# Patient Record
Sex: MALE | Race: WHITE | NOT HISPANIC OR LATINO | Employment: FULL TIME | URBAN - METROPOLITAN AREA
[De-identification: names, ages, dates, MRNs, and addresses within clinical notes are randomized per-mention and may not be internally consistent; named-entity substitution may affect disease eponyms.]

---

## 2019-01-10 ENCOUNTER — OFFICE VISIT (OUTPATIENT)
Dept: NEUROLOGY | Facility: CLINIC | Age: 44
End: 2019-01-10
Payer: COMMERCIAL

## 2019-01-10 VITALS
WEIGHT: 184.3 LBS | DIASTOLIC BLOOD PRESSURE: 70 MMHG | SYSTOLIC BLOOD PRESSURE: 120 MMHG | HEIGHT: 67 IN | BODY MASS INDEX: 28.93 KG/M2 | HEART RATE: 82 BPM

## 2019-01-10 DIAGNOSIS — F41.9 ANXIETY DISORDER, UNSPECIFIED TYPE: ICD-10-CM

## 2019-01-10 DIAGNOSIS — R51.9 DAILY HEADACHE: ICD-10-CM

## 2019-01-10 DIAGNOSIS — S06.9X0A MILD TRAUMATIC BRAIN INJURY, WITHOUT LOSS OF CONSCIOUSNESS, INITIAL ENCOUNTER (HCC): Primary | ICD-10-CM

## 2019-01-10 PROBLEM — E78.00 PURE HYPERCHOLESTEROLEMIA: Status: ACTIVE | Noted: 2018-12-28

## 2019-01-10 PROBLEM — IMO0002 DEGENERATION OF INTERVERTEBRAL DISC: Status: ACTIVE | Noted: 2018-12-28

## 2019-01-10 PROBLEM — S06.9X9A MILD TRAUMATIC BRAIN INJURY (HCC): Status: ACTIVE | Noted: 2019-01-10

## 2019-01-10 PROBLEM — S06.9XAA MILD TRAUMATIC BRAIN INJURY (HCC): Status: ACTIVE | Noted: 2019-01-10

## 2019-01-10 PROBLEM — K21.9 GASTROESOPHAGEAL REFLUX DISEASE: Status: ACTIVE | Noted: 2018-12-28

## 2019-01-10 PROCEDURE — 99244 OFF/OP CNSLTJ NEW/EST MOD 40: CPT | Performed by: PSYCHIATRY & NEUROLOGY

## 2019-01-10 RX ORDER — IBUPROFEN 200 MG
200 TABLET ORAL EVERY 6 HOURS PRN
COMMUNITY
End: 2019-08-17 | Stop reason: ALTCHOICE

## 2019-01-10 RX ORDER — KETOROLAC TROMETHAMINE 10 MG/1
10 TABLET, FILM COATED ORAL DAILY PRN
Qty: 10 TABLET | Refills: 0 | Status: SHIPPED | OUTPATIENT
Start: 2019-01-10 | End: 2019-03-31

## 2019-01-10 RX ORDER — ALPRAZOLAM 0.5 MG/1
0.5 TABLET ORAL 3 TIMES DAILY PRN
COMMUNITY
Start: 2018-12-28

## 2019-01-10 NOTE — PATIENT INSTRUCTIONS
Cognitive Plan:   [x]  gradual return to normal cognitive activity with time    Activity Plan:  General counseling as discussed regarding appropriate level of tolerable physical activity  Gradual return to physical activity: At least 24-48 hours at each level  If no recurrence of symptoms, may advance to next level  If activity worsens symptoms, then drop back 1 level for 24-48 hours, then try again  It is ok to push through light symptoms, we just don't want to significantly exacerbate symptoms  []   Level 1: No activity  [x]   Level 2: Mild aerobic (walking, light exercise, stationary bike, easy swimming)  [x]   Level 3: Moderate aerobic + movement (jogging/running, hard swimming, etc)  []   Level 4: Heavy aerobic + movement + thinking (sprinting, running, non-contact sport specific drills)  []   Level 5: Full contact practice  []   Level 6: Full contact game/competitive play    Additional Testing or Referrals:    [x] Psychologist - cognitive behavioral therapy    Headache/migraine treatment:   Abortive medications (for immediate treatment of a headache): Ok to take ibuprofen or acetaminophen for headaches, but try to limit the amount and frequency that you are taking to avoid medication overuse/rebound headache    - gradually wean off of your daily ibuprofen  -     ketorolac (TORADOL) 10 mg tablet; Take 1 tablet (10 mg total) by mouth daily as needed for severe pain Max 1/day, 3/week, 10/month  Do not use with other OTC pain meds      Over the counter preventive supplements for headaches/migraines   (to take every day to help prevent headaches - not to take at the time of headache):  1  Magnesium 500mg daily    2  Riboflavin (Vitamin B2) 400mg daily  (FYI B2 may make your urine bright/neon yellow)  AND/OR  3   Herbal medication: Petasites/Butterbur 150 mg daily  (When choosing your Butterbur online or in the store, beware that there are some poor preps containing pyrrolizidine alkaloids (PAs) that can be harmful to the liver  Therefore, do not use butterbur products that are not certified and labeled as hepatotoxic PA-free )    Lifestyle Recommendations:  4  Maintain good sleep hygiene  Going to bed and waking up at consistent times, avoiding excessive daytime naps, avoiding caffeinated beverages in the evening, avoid excessive stimulation in the evening and generally using bed primarily for sleeping  One hour before bedtime would recommend turning lights down lower, decreasing your activity (may read quietly, listen to music at a low volume)  When you get into bed, should eliminate all technology (no texting, emailing, playing with your phone, iPad or tablet in bed)  5  Maintain good hydration  Drink  2L of fluid a day (4 typical small water bottles)  6  Maintain good nutrition  In particular don't skip meals and eat balanced meals regularly  Education and Follow-up  7  Please contact us if any questions or concerns arise    Follow up 4 weeks

## 2019-01-10 NOTE — PROGRESS NOTES
[address] [facility]   PATIENT:  Ann Rojo  MRN:  [id number]  :  1975  DATE OF SERVICE:  1/10/2019  REFERRED BY: Destiny Guzman MD  PMD: Niecy Perez MD    Assessment:     Ann Rojo is a delightful 37 y o  male referred here for evaluation of mild TBI/concussion that occurred on 2018 when he wiped out skiing and slammed forward hitting the front of his head, chest and arm  He was wearing a helmet  He denies loss of consciousness, amnesia, or other acute concussion symptoms that day  He did 2 more runs before stopping because of chest and arm pain  The next day headaches as well as other typical acute concussion symptoms developed, and on 2018 he went to the emergency room as headache worsened where noncontrast head CT was unremarkable, he was told he had a concussion and given a shot of Toradol which relieved headache for a few hours  The next 2 weeks headaches would come and go daily, some phonophobia, he had some trouble with cognitive dysfunction, but generally things were gradually improving over the first 2 weeks  He reports in the last 2 weeks things have worsened as discussed below  Contributing factors for possible prolonged recovery include: preexisting weekly headaches, possible medication overuse headache, anxiety or depression, stress  Neurocognitive assessment reveals normal neurological exam  [facility]   Per report noncontrast head CT 2018 was unremarkable    Daily headache:  Headaches have become more frequent and have developed into a constant daily headache  Mostly tension headache qualities, located bifrontal and bitemporal, throbbing aching dull  Associated with phonophobia in very loud noisy environments  He has been taking ibuprofen 400 mg t i d  For the past 4 5 weeks despite the fact that it does not usually help    We discussed likely component of medication overuse/rebound headache and recommended gradually weaning off the over the counter pain meds  - trial of Toradol 10 mg as needed to try and abort this headache since it helped in the past, discussed side effects, proper use and precautions  - should continue to gradually improve as he returns to his normal daily activities  - discussed considering headache preventative supplements including magnesium, riboflavin and butterbur  - if it does not resolve over the next month could consider prescription medication that could benefit headaches, depressed mood and anxiety - such as venlafaxine or low-dose amitriptyline    Mood:  He has a history of anxiety that has been recently controlled, however today it sounds like he has a some acute anxiety and depressed mood regarding his symptoms and restrictions related to his concussion  We discussed that daily fatigue, sleeping more than usual, decreased pleasure/interest in doing things he normally likes to do as well as his wife reporting she has noticed his mood has been a little low are all symptoms of depression and in this case likely acute adjustment disorder with depressed mood  - we discussed that as he gradually returns to his normal activities this should resolve to baseline, if it does not I discussed and suggested cognitive behavioral therapy  - if it does not resolve over the next month or so could consider prescription medication that could benefit headaches, depression mood and anxiety - such as venlafaxine or low-dose amitriptyline  - facial flushing of unknown etiology, although likely related to anxiety/mood and change from his normal routine, should gradually improve    Cognitive dysfunction:  He reports he feels his cognitive function is worse over the past 2 weeks  Problems with concentration, fogginess and fogginess  He reports this seems to worsen with more stimulation he has  He has been taking it easy over the past month with only 2-3 hours at work, rather than the normal 8 hr day he has in [facility]    - we discussed this is likely multifactorial, including likely related to mood and daily headache  We discussed interaction between anxiety, depressed mood and cognitive function and how hypervigilance and worrying about symptoms, concern for continued brain dysfunction from concussion, as well as change from normal routine can affect cognitive function  - recommended gradually returning to his normal cognitive function and discussed how he may have some symptoms as he works more, but this is at this point due to deconditioning    Mild TBI/concussion without loss of consciousness  We have discussed concussions and the natural course of recovery  We have discussed that symptoms from a concussion typically take 2-4 weeks to resolve, but that sometimes symptoms can linger on due to contributing factors  We discussed that at this point I recommend gradual return of his usual cognitive and physical activity, and have outlined a plan to follow so that activity may be added back slowly  Likelihood of Concussion:  Witnessed mechanism  yes   Typical Symptoms yes   Onset of symptoms within 24H yes   Improving Time Course yes   Is there an alternative Diagnosis unlikely     Typical Symptoms= Yes if:  ? 1 A symptoms: Loss of consciousness or Amnesia  ? 3 B symptoms: Confusion/Fogginess, Headache, Dizziness/Loss  of  Balance, Nausea/Vomiting, Drowsiness, Vision  Changes/Photophobia, Phonophobia, Tinnitus, Mood  change    How would you classify the concussion? definite     Plan/ Recommendation:       Cognitive Plan:   [x]  gradual return to normal cognitive activity with time    Activity Plan:  General counseling as discussed regarding appropriate level of tolerable physical activity  Recommended gradually returning to physical activity over the next days to weeks    Recommended avoiding activities that are risky for repeated head injury untill he is feeling closer to baseline, since if he is still deconditioned he'd be putting himself at higher risk for another fall  Additional Testing or Referrals:    [x] Psychologist - cognitive behavioral therapy - if needed in the future    Headache/migraine treatment:   Abortive medications (for immediate treatment of a headache): Ok to take ibuprofen or acetaminophen for headaches, but try to limit the amount and frequency that you are taking to avoid medication overuse/rebound headache    - gradually wean off of your daily ibuprofen  -     ketorolac (TORADOL) 10 mg tablet; Take 1 tablet (10 mg total) by mouth daily as needed for severe pain Max 1/day, 3/week, 10/month  Do not use with other OTC pain meds      Over the counter preventive supplements for headaches/migraines   (to take every day to help prevent headaches - not to take at the time of headache):  1  Magnesium 500mg daily    2  Riboflavin (Vitamin B2) 400mg daily  (FYI B2 may make your urine bright/neon yellow)  AND/OR  3  Herbal medication: Petasites/Butterbur 150 mg daily  (When choosing your Butterbur online or in the store, beware that there are some poor preps containing pyrrolizidine alkaloids (PAs) that can be harmful to the liver  Therefore, do not use butterbur products that are not certified and labeled as hepatotoxic PA-free )    Lifestyle Recommendations:  4  Maintain good sleep hygiene  Going to bed and waking up at consistent times, avoiding excessive daytime naps, avoiding caffeinated beverages in the evening, avoid excessive stimulation in the evening and generally using bed primarily for sleeping  One hour before bedtime would recommend turning lights down lower, decreasing your activity (may read quietly, listen to music at a low volume)  When you get into bed, should eliminate all technology (no texting, emailing, playing with your phone, iPad or tablet in bed)  5  Maintain good hydration  Drink  2L of fluid a day (4 typical small water bottles)  6  Maintain good nutrition   In particular don't skip meals and eat balanced meals regularly  Education and Follow-up  7  Please contact us if any questions or concerns arise  Follow up 4 weeks if needed, can cancel if symptoms improving or resolved          CC:   Francisco Barksdale is a 37y o  year old  right handed male who presents for evaluation following a possible concussion  History of Present Illness:     Date/time of injury:  12/9/18  Definite reported mechanism of injury?   (discrete event with force to the head or rapid head movement without impact): Yes   Mechanism/Cause of injury: while skiing  Impact Location: frontal  Intracranial injury or skull fx?: No  Amnesia:  Suraj? negative; Retro? negative; Loss of Conciousness?  did not occur   Seizure? No  Was there an onset of typical symptoms within 24-48 hours of the injury event? Yes     Specifics: On 12/9/18 Francisco Barksdale was skiing and hit a patch of wet sticky snow which brought him to an instant stop, he slammed forward, and hit the front of his head, chest and arm  He had a helmet on  He reports acute symptoms included: chest and arm sore  (No LOC, no amnesia, the first day had - no confusion, no headache, no N/V, no blurred vision, no dizziness)  He did two more runs and stopped because body started to hurt from fall    The next day Monday the headaches started, were bad  Tuesday 12/11/18 went to the ED - Noncontrast head CT was reportedly unremarkable  Given a shot of toradol which helped for a couple hours  Told he had a concussion  Next two weeks   - headaches would come and go every day, but there were times where they weren't bad or weren't there    - he was taking it easy and trying not to work too much  - Chad Heard went ok   At the end of the first two weeks things were better    The last two weeks things have gone down hill  Headaches more frequent and the past week it has been a constant headache, waves of worse or better    Daily Headache  - continuous over the past week or so   - severity 4 to 8/10  - location:  bifrontal, bitemporal (when first happened was in the back of the head)  - quality: throbbing, achy, dull  - associated symptoms include:  phonophobia if loud and noisy - overly crowded loud places    Denies photophobia, nausea, vomiting  - has been taking ibuprofen - 400 mg TID, for the past 4 5 weeks, does not usually help    Water - 32 oz a day   Diet: not skipping meals       Cognitive:  - 2-3 hours at work makes things more foggy, harder to concentrate  - Worse over the past two weeks  - Gets home and does nothing   - The more stimulation he would do the more pronounced the fuzzyness or fogginess  - Normally would be doing an 8 hour day 6-9 on average  - job: Real estate closing - title insurance     Mood:  Some symptoms of depressed mood lately  Daily fatigue  Sleeping more than usual and taking naps  Works for 3 hours and comes home and wants to do nothing    History of anxiety  Has been good for two years or more  Many years ago was bad with work  Lorazepam has not taken one in many months  Never on prescription medication   Never followed with a counselor       Facial flushing   Past week, facial flushing at night time, face and ears and neck gets real hot, while watching TV  - has not really happened much in the past - but maybe once a week or month if annoyed or angry or frustrated  BP has been checked and is around 748, diastolic as high as 88 one day     Lifestyle factors:  Physical activity at baseline: play golf once a week, ski  - 2-3 times a week, 1-2 mile walks a day with dog twice a day  Current level of physical activity: not walking as much - last two weeks once a day mostly , not skiing at all,   - fatigue     Sleep: more than usual - sleeping 8-9 hours  Normally 8 - so an hour more  Naps now almost every day for 15-30 mins     The following portions of the patient's history were reviewed in the system and updated as appropriate: allergies, current medications, past family history, past medical history, past social history, past surgical history and problem list     Past Medical History:     1  Any history of prior Concussion? Yes    4-5 years ago, skiing, symptoms lasted two weeks  Any other TBI's aside from Concussion? no   2  Preexisting Headache history? 1-2 twice a week - bifrontal   Headache Type:  [] Migraine  [x] Tension  3  Preexisting Psych history? negative      [x] Anxiety   Prior Psych treatment? no  4  Preexisting Learning disability?   no  5  Preexisting Sleep problems? no  6  History of seizures/epilepsy (non febrile) no    Past Medical History:   Diagnosis Date    Anxiety     Hyperlipidemia      Patient Active Problem List   Diagnosis    [facility] ([facility] [address] )    Daily headache    Anxiety disorder    Degeneration of intervertebral disc    Foot pain, bilateral    Gastroesophageal reflux disease    Lumbosacral spondylosis    Pure hypercholesterolemia       Medications:      Current Outpatient Prescriptions   Medication Sig Dispense Refill    ALPRAZolam (XANAX) 0 5 mg tablet Take 0 5 mg by mouth Three times daily as needed      ibuprofen (MOTRIN) 200 mg tablet Take 200 mg by mouth every 6 (six) hours as needed for mild pain      MAGNESIUM PO Take 1 tablet by mouth daily      Multiple Vitamin (MULTI-VITAMIN DAILY PO) Take 1 tablet by mouth daily         0     No current facility-administered medications for this visit           Allergies:    No Known Allergies    Family History:       Family History   Problem Relation Age of Onset    No Known Problems Mother     Hyperlipidemia Father     Alzheimer's disease Maternal Grandfather     Stomach cancer Paternal Grandfather          Social History:   Work:    Education: 12th  Lives with wife and dog    Illicit Drugs: denies  Alcohol/tobacco: Denies tobacco use, alcohol intake: social drinker    Social History     Social History    Marital status: /Civil Union     Spouse name: N/A    Number of children: N/A    Years of education: N/A     Occupational History    Not on file  Social History Main Topics    Smoking status: Never Smoker    Smokeless tobacco: Never Used    Alcohol use Yes      Comment: social use    Drug use: No    Sexual activity: Not on file     Other Topics Concern    Not on file     Social History Narrative    No narrative on file       Objective:                      Physical Exam:                                                                 Vitals:            Constitutional:    /70 (BP Location: Left arm, Patient Position: Sitting, Cuff Size: Adult)   Pulse 82   Ht 5' 7" (1 702 m)   Wt 83 6 kg (184 lb 4 8 oz)   BMI 28 87 kg/m²   BP Readings from Last 3 Encounters:   01/10/19 120/70     Pulse Readings from Last 3 Encounters:   01/10/19 82         Well developed, well nourished, well groomed  No dysmorphic features  HEENT:  Normocephalic atraumatic  No meningismus  Oropharynx is clear and moist  No oral mucosal lesions  Chest:  Respirations regular and unlabored  Cardiovascular:  Regular rate, intact distal pulses  Distal extremities warm without palpable edema or tenderness, no observed significant swelling  Musculoskeletal:  Full range of motion  (see below under neurologic exam for evaluation of motor function and gait)   Skin:  warm and dry, not diaphoretic  No apparent birthmarks or stigmata of neurocutaneous disease  Psychiatric:  Normal behavior and appropriate affect        Neurological Examination:     Mental status/cognitive function:   Orientated to time, place and person  Recent and remote memory intact  Attention span and concentration as well as fund of knowledge are appropriate for age  Normal language and spontaneous speech  STANDARD ASSESSMENT OF CONCUSSION ([address])     1  Orientation (1 point for each correct) Total 5 points   Score   What month is it? 1   What is the date today?  1   What is the day of the week? 1   What year is it? 1   What time is it right now? (within 1 hour is correct) 1      2  Immediate memory (1 point for each): 5 words, 3 trials - total 15 points  finger, edwar, blanket, lemon, insect    Alternates lists:  candle, paper, sugar, sandwich, wagon  baby, monkey, perfume, sunset, iron  elbow, apple, carpet, saddle, bubble  Jacket, arrow, pepper, cotton, movie  Dollar, honey, mirror, saddle, anchor       Trial 1 Trial 2 Trial 3   Word 1 1 1 1   Word 2 1 1 1   Word 3 1 1 1   Word 4 1 1 1   Word 5 1 1 1       3  Concentration:     a  Digits Backwards (1 point for each): 3, 4, 5, 6 digit span - Total 5 points  [x]493,  [K]9852,  []59646,      Alternate list:  Rodolfo Saavedra,  [address] [place] [address],  [address] [place] [address],     [address] [place] [address],  [address],  [][id number],  [][id number]  [] 415, []6007,  []04724,  [][id number]     b  Months in REVERSE order (1 point for entire sequence correct)  (Dec, Nov, Oct, Sept, Aug, July, June, May, April, March, Feb, Jan) [x]       4  Delayed recall 5 minutes later  (1 point for each of the 5 words) - Total 5 points    Word 1 [x]   Word 2 [x]   Word 3 []   Word 4 []   Word 5 []         TOTALS 1/10/2019      Orientation (of 5) 5    Immediate memory (of 15) 15    Concentration (of 5) 4    Delayed recall (of 5) 2    Total (max 30) 26        Cranial Nerves:  II-visual fields full  Fundi appear normal bilaterally  III, IV, VI-Pupils were equal, round, and reactive to light and accomodation  Extraocular movements were full and conjugate without nystagmus  conjugate gaze, normal smooth pursuits, normal saccades   V-facial sensation symmetric  VII-facial expression symmetric, intact forehead wrinkle, strong eye closure, symmetric smile    VIII-hearing grossly intact bilaterally   IX, X-palate elevation symmetric, no dysarthria  XI-shoulder shrug strength intact    XII-tongue protrusion midline  Motor Exam: symmetric bulk and tone throughout, no pronator drift   Power/strength 5/5 bilateral upper and lower extremities, no atrophy, fasciculations or abnormal movements noted  Sensory: grossly intact light touch in all extremities  Reflexes: brachioradialis 2+, biceps 2+, knee 2+, ankle 2+ bilaterally  No ankle clonus  Coordination: Finger nose finger intact bilaterally, no apparent dysmetria, ataxia or tremor noted  Gait: steady casual and tandem gait  Romberg Negative  Pertinent lab results: none available      Imaging:   [x] CT: [x] Normal per report on 12/11/2018         Review of Systems:   ROS obtained by medical assistant Personally reviewed and updated if indicated  Review of Systems   Constitutional: Positive for fatigue  Negative for appetite change and fever  HENT: Negative  Negative for hearing loss, tinnitus, trouble swallowing and voice change  Eyes: Negative  Negative for photophobia and pain  Respiratory: Negative  Negative for shortness of breath  Cardiovascular: Negative  Negative for palpitations  Gastrointestinal: Negative  Negative for nausea and vomiting  Endocrine: Negative  Negative for cold intolerance and heat intolerance  Genitourinary: Negative  Negative for dysuria, frequency and urgency  Musculoskeletal: Negative  Negative for myalgias and neck pain  Skin: Negative  Negative for rash  Neurological: Positive for headaches  Negative for dizziness, tremors, seizures, syncope, facial asymmetry, speech difficulty, weakness, light-headedness and numbness  Hematological: Negative  Does not bruise/bleed easily  Psychiatric/Behavioral: Positive for confusion and sleep disturbance  Negative for hallucinations  I have spent 60 minutes with Patient and family today in which greater than 50% of this time was spent in counseling/coordination of care regarding Prognosis, Risks and benefits of tx options, Intructions for management, Patient and family education, Importance of tx compliance, Risk factor reductions and Impressions        Author:  Andreea Zhao MD [facility] trained Concussion Specialist

## 2019-01-10 NOTE — LETTER
January 10, 2019     Topher Jose MD  [facility]  [facility]  [address] [address]    Patient: Francisco Barksdale   YOB: 1975   Date of Visit: 1/10/2019       Dear Dr Pednleton : Thank you for referring Francisco Barksdale to me for evaluation  Below are my notes for this consultation  If you have questions, please do not hesitate to call me  I look forward to following your patient along with you  Sincerely,        Delinda Jeans, MD        CC: No Recipients  Delinda Jeans, MD  1/10/2019  3:59 PM  Sign at close encounter  [facility] Consult   PATIENT:  Francisco Barksdale  MRN:  [id number]  :  1975  DATE OF SERVICE:  1/10/2019  REFERRED BY: Sharee Mcfarlane MD  PMD: Topher Jose MD    Assessment:     Francisco Barksdale is a delightful 37 y o  male referred here for evaluation of mild TBI/concussion that occurred on 2018 when he wiped out skiing and slammed forward hitting the front of his head, chest and arm  He was wearing a helmet  He denies loss of consciousness, amnesia, or other acute concussion symptoms that day  He did 2 more runs before stopping because of chest and arm pain  The next day headaches as well as other typical acute concussion symptoms developed, and on 2018 he went to the emergency room as headache worsened where noncontrast head CT was unremarkable, he was told he had a concussion and given a shot of Toradol which relieved headache for a few hours  The next 2 weeks headaches would come and go daily, some phonophobia, he had some trouble with cognitive dysfunction, but generally things were gradually improving over the first 2 weeks  He reports in the last 2 weeks things have worsened as discussed below  Contributing factors for possible prolonged recovery include: preexisting weekly headaches, possible medication overuse headache, anxiety or depression, stress      Neurocognitive assessment reveals normal neurological exam  [facility] 26/30  Per report noncontrast head CT 12/11/2018 was unremarkable    Daily headache:  Headaches have become more frequent and have developed into a constant daily headache  Mostly tension headache qualities, located bifrontal and bitemporal, throbbing aching dull  Associated with phonophobia in very loud noisy environments  He has been taking ibuprofen 400 mg t i d  For the past 4 5 weeks despite the fact that it does not usually help  We discussed likely component of medication overuse/rebound headache and recommended gradually weaning off the over the counter pain meds  - trial of Toradol 10 mg as needed to try and abort this headache since it helped in the past, discussed side effects, proper use and precautions  - should continue to gradually improve as he returns to his normal daily activities  - discussed considering headache preventative supplements including magnesium, riboflavin and butterbur  - if it does not resolve over the next month could consider prescription medication that could benefit headaches, depressed mood and anxiety - such as venlafaxine or low-dose amitriptyline    Mood:  He has a history of anxiety that has been recently controlled, however today it sounds like he has a some acute anxiety and depressed mood regarding his symptoms and restrictions related to his concussion  We discussed that daily fatigue, sleeping more than usual, decreased pleasure/interest in doing things he normally likes to do as well as his wife reporting she has noticed his mood has been a little low are all symptoms of depression and in this case likely acute adjustment disorder with depressed mood     - we discussed that as he gradually returns to his normal activities this should resolve to baseline, if it does not I discussed and suggested cognitive behavioral therapy  - if it does not resolve over the next month or so could consider prescription medication that could benefit headaches, depression mood and anxiety - such as venlafaxine or low-dose amitriptyline  - facial flushing of unknown etiology, although likely related to anxiety/mood and change from his normal routine, should gradually improve    Cognitive dysfunction:  He reports he feels his cognitive function is worse over the past 2 weeks  Problems with concentration, fogginess and fogginess  He reports this seems to worsen with more stimulation he has  He has been taking it easy over the past month with only 2-3 hours at work, rather than the normal 8 hr day he has in [employer]  - we discussed this is likely multifactorial, including likely related to mood and daily headache  We discussed interaction between anxiety, depressed mood and cognitive function and how hypervigilance and worrying about symptoms, concern for continued brain dysfunction from concussion, as well as change from normal routine can affect cognitive function  - recommended gradually returning to his normal cognitive function and discussed how he may have some symptoms as he works more, but this is at this point due to deconditioning    Mild TBI/concussion without loss of consciousness  We have discussed concussions and the natural course of recovery  We have discussed that symptoms from a concussion typically take 2-4 weeks to resolve, but that sometimes symptoms can linger on due to contributing factors  We discussed that at this point I recommend gradual return of his usual cognitive and physical activity, and have outlined a plan to follow so that activity may be added back slowly  Likelihood of Concussion:  Witnessed mechanism  yes   Typical Symptoms yes   Onset of symptoms within 24H yes   Improving Time Course yes   Is there an alternative Diagnosis unlikely     Typical Symptoms= Yes if:  ? 1 A symptoms: Loss of consciousness or Amnesia  ?  3 B symptoms: Confusion/Fogginess, Headache, Dizziness/Loss  of  Balance, Nausea/Vomiting, Drowsiness, Vision Changes/Photophobia, Phonophobia, Tinnitus, Mood  change    How would you classify the concussion? definite     Plan/ Recommendation:       Cognitive Plan:   [x]  gradual return to normal cognitive activity with time    Activity Plan:  General counseling as discussed regarding appropriate level of tolerable physical activity  Recommended gradually returning to physical activity over the next days to weeks  Recommended avoiding activities that are risky for repeated head injury untill he is feeling closer to baseline, since if he is still deconditioned he'd be putting himself at higher risk for another fall  Additional Testing or Referrals:    [x] Psychologist - cognitive behavioral therapy - if needed in the future    Headache/migraine treatment:   Abortive medications (for immediate treatment of a headache): Ok to take ibuprofen or acetaminophen for headaches, but try to limit the amount and frequency that you are taking to avoid medication overuse/rebound headache    - gradually wean off of your daily ibuprofen  -     ketorolac (TORADOL) 10 mg tablet; Take 1 tablet (10 mg total) by mouth daily as needed for severe pain Max 1/day, 3/week, 10/month  Do not use with other OTC pain meds      Over the counter preventive supplements for headaches/migraines   (to take every day to help prevent headaches - not to take at the time of headache):  1  Magnesium 500mg daily    2  Riboflavin (Vitamin B2) 400mg daily  (FYI B2 may make your urine bright/neon yellow)  AND/OR  3  Herbal medication: Petasites/Butterbur 150 mg daily  (When choosing your Butterbur online or in the store, beware that there are some poor preps containing pyrrolizidine alkaloids (PAs) that can be harmful to the liver  Therefore, do not use butterbur products that are not certified and labeled as hepatotoxic PA-free )    Lifestyle Recommendations:  4  Maintain good sleep hygiene    Going to bed and waking up at consistent times, avoiding excessive daytime naps, avoiding caffeinated beverages in the evening, avoid excessive stimulation in the evening and generally using bed primarily for sleeping  One hour before bedtime would recommend turning lights down lower, decreasing your activity (may read quietly, listen to music at a low volume)  When you get into bed, should eliminate all technology (no texting, emailing, playing with your phone, iPad or tablet in bed)  5  Maintain good hydration  Drink  2L of fluid a day (4 typical small water bottles)  6  Maintain good nutrition  In particular don't skip meals and eat balanced meals regularly  Education and Follow-up  7  Please contact us if any questions or concerns arise  Follow up 4 weeks if needed, can cancel if symptoms improving or resolved          CC:   Anjum Sanchez is a 37y o  year old  right handed male who presents for evaluation following a possible concussion  History of Present Illness:     Date/time of injury:  12/9/18  Definite reported mechanism of injury?   (discrete event with force to the head or rapid head movement without impact): Yes   Mechanism/Cause of injury: while skiing  Impact Location: frontal  Intracranial injury or skull fx?: No  Amnesia:  Suraj? negative; Retro? negative; Loss of Conciousness?  did not occur   Seizure? No  Was there an onset of typical symptoms within 24-48 hours of the injury event? Yes     Specifics: On 12/9/18 Anjum Sanchez was skiing and hit a patch of wet sticky snow which brought him to an instant stop, he slammed forward, and hit the front of his head, chest and arm  He had a helmet on   He reports acute symptoms included: chest and arm sore  (No LOC, no amnesia, the first day had - no confusion, no headache, no N/V, no blurred vision, no dizziness)  He did two more runs and stopped because body started to hurt from fall    The next day Monday the headaches started, were bad  Tuesday 12/11/18 went to the ED - Noncontrast head CT was reportedly unremarkable  Given a shot of toradol which helped for a couple hours  Told he had a concussion  Next two weeks   - headaches would come and go every day, but there were times where they weren't bad or weren't there  - he was taking it easy and trying not to work too much  - Cheryl Beaulieu went ok   At the end of the first two weeks things were better    The last two weeks things have gone down hill  Headaches more frequent and the past week it has been a constant headache, waves of worse or better    Daily Headache  - continuous over the past week or so   - severity 4 to 8/10  - location:  bifrontal, bitemporal (when first happened was in the back of the head)  - quality: throbbing, achy, dull  - associated symptoms include:  phonophobia if loud and noisy - overly crowded loud places    Denies photophobia, nausea, vomiting  - has been taking ibuprofen - 400 mg TID, for the past 4 5 weeks, does not usually help    Water - 32 oz a day   Diet: not skipping meals       Cognitive:  - 2-3 hours at work makes things more foggy, harder to concentrate  - Worse over the past two weeks  - Gets home and does nothing   - The more stimulation he would do the more pronounced the fuzzyness or fogginess  - Normally would be doing an 8 hour day 6-9 on average  - job: Real estate closing - title [employer]     Mood:  Some symptoms of depressed mood lately  Daily fatigue  Sleeping more than usual and taking naps  Works for 3 hours and comes home and wants to do nothing    History of anxiety  Has been good for two years or more  Many years ago was bad with work  Lorazepam has not taken one in many months  Never on prescription medication   Never followed with a counselor       Facial flushing   Past week, facial flushing at night time, face and ears and neck gets real hot, while watching TV  - has not really happened much in the past - but maybe once a week or month if annoyed or angry or frustrated  BP has been checked and is around 120, diastolic as high as 88 one day     Lifestyle factors:  Physical activity at baseline: play golf once a week, ski  - 2-3 times a week, 1-2 mile walks a day with dog twice a day  Current level of physical activity: not walking as much - last two weeks once a day mostly , not skiing at all,   - fatigue     Sleep: more than usual - sleeping 8-9 hours  Normally 8 - so an hour more  Naps now almost every day for 15-30 mins     The following portions of the patient's history were reviewed in the system and updated as appropriate: allergies, current medications, past family history, past medical history, past social history, past surgical history and problem list     Past Medical History:     1  Any history of prior Concussion? Yes    4-5 years ago, skiing, symptoms lasted two weeks  Any other TBI's aside from Concussion? no   2  Preexisting Headache history? 1-2 twice a week - bifrontal   Headache Type:  [] Migraine  [x] Tension  3  Preexisting Psych history? negative      [x] Anxiety   Prior Psych treatment? no  4  Preexisting Learning disability?   no  5  Preexisting Sleep problems? no  6   History of seizures/epilepsy (non febrile) no    Past Medical History:   Diagnosis Date    Anxiety     Hyperlipidemia      Patient Active Problem List   Diagnosis    Mild traumatic brain injury ([facility] [address] )    Daily headache    Anxiety disorder    Degeneration of intervertebral disc    Foot pain, bilateral    Gastroesophageal reflux disease    Lumbosacral spondylosis    Pure hypercholesterolemia       Medications:      Current Outpatient Prescriptions   Medication Sig Dispense Refill    ALPRAZolam (XANAX) 0 5 mg tablet Take 0 5 mg by mouth Three times daily as needed      ibuprofen (MOTRIN) 200 mg tablet Take 200 mg by mouth every 6 (six) hours as needed for mild pain      MAGNESIUM PO Take 1 tablet by mouth daily      Multiple Vitamin (MULTI-VITAMIN DAILY PO) Take 1 tablet by mouth daily         0     No current facility-administered medications for this visit  Allergies:    No Known Allergies    Family History:       Family History   Problem Relation Age of Onset    No Known Problems Mother     Hyperlipidemia Father     Alzheimer's disease Maternal Grandfather     Stomach cancer Paternal Grandfather          Social History:   Work:    Education: 12th  Lives with wife and dog    Illicit Drugs: denies  Alcohol/tobacco: Denies tobacco use, alcohol intake: social drinker    Social History     Social History    Marital status: /Civil Union     Spouse name: N/A    Number of children: N/A    Years of education: N/A     Occupational History    Not on file  Social History Main Topics    Smoking status: Never Smoker    Smokeless tobacco: Never Used    Alcohol use Yes      Comment: social use    Drug use: No    Sexual activity: Not on file     Other Topics Concern    Not on file     Social History Narrative    No narrative on file       Objective:                      Physical Exam:                                                                 Vitals:            Constitutional:    /70 (BP Location: Left arm, Patient Position: Sitting, Cuff Size: Adult)   Pulse 82   Ht 5' 7" (1 702 m)   Wt 83 6 kg (184 lb 4 8 oz)   BMI 28 87 kg/m²    BP Readings from Last 3 Encounters:   01/10/19 120/70     Pulse Readings from Last 3 Encounters:   01/10/19 82         Well developed, well nourished, well groomed  No dysmorphic features  HEENT:  Normocephalic atraumatic  No meningismus  Oropharynx is clear and moist  No oral mucosal lesions  Chest:  Respirations regular and unlabored  Cardiovascular:  Regular rate, intact distal pulses  Distal extremities warm without palpable edema or tenderness, no observed significant swelling  Musculoskeletal:  Full range of motion   (see below under neurologic exam for evaluation of motor function and gait)   Skin:  warm and dry, not diaphoretic  No apparent birthmarks or stigmata of neurocutaneous disease  Psychiatric:  Normal behavior and appropriate affect        Neurological Examination:     Mental status/cognitive function:   Orientated to time, place and person  Recent and remote memory intact  Attention span and concentration as well as fund of knowledge are appropriate for age  Normal language and spontaneous speech  STANDARD ASSESSMENT OF CONCUSSION ([address])     1  Orientation (1 point for each correct) Total 5 points   Score   What month is it? 1   What is the date today? 1   What is the day of the week? 1   What year is it? 1   What time is it right now? (within 1 hour is correct) 1      2  Immediate memory (1 point for each): 5 words, 3 trials - total 15 points  finger, edwar, blanket, lemon, insect    Alternates lists:  candle, paper, sugar, sandwich, wagon  baby, monkey, perfume, sunset, iron  elbow, apple, carpet, saddle, bubble  Jacket, arrow, pepper, cotton, movie  Dollar, honey, mirror, saddle, anchor       Trial 1 Trial 2 Trial 3   Word 1 1 1 1   Word 2 1 1 1   Word 3 1 1 1   Word 4 1 1 1   Word 5 1 1 1       3  Concentration:     a  Digits Backwards (1 point for each): 3, 4, 5, 6 digit span - Total 5 points  [x]493,  [V]7365,  []67905,      Alternate list:  Cheko Al[place]ga,  [address] [place] [address],  [address] [place] [address],     [address] [place] [address],  [address],  [][id number],  [][id number]  [] 415, []8663,  [][id number],  [][id number]     b  Months in REVERSE order (1 point for entire sequence correct)  (Dec, Nov, Oct, Sept, Aug, July, June, May, April, March, Feb, Jose) [x]       4  Delayed recall 5 minutes later  (1 point for each of the 5 words) - Total 5 points    Word 1 [x]   Word 2 [x]   Word 3 []   Word 4 []   Word 5 []         TOTALS 1/10/2019      Orientation (of 5) 5    Immediate memory (of 15) 15    Concentration (of 5) 4    Delayed recall (of 5) 2    Total (max 30) 26        Cranial Nerves:  II-visual fields full  Fundi appear normal bilaterally    III, IV, VI-Pupils were equal, round, and reactive to light and accomodation  Extraocular movements were full and conjugate without nystagmus  conjugate gaze, normal smooth pursuits, normal saccades   V-facial sensation symmetric  VII-facial expression symmetric, intact forehead wrinkle, strong eye closure, symmetric smile    VIII-hearing grossly intact bilaterally   IX, X-palate elevation symmetric, no dysarthria  XI-shoulder shrug strength intact    XII-tongue protrusion midline  Motor Exam: symmetric bulk and tone throughout, no pronator drift  Power/strength 5/5 bilateral upper and lower extremities, no atrophy, fasciculations or abnormal movements noted  Sensory: grossly intact light touch in all extremities  Reflexes: brachioradialis 2+, biceps 2+, knee 2+, ankle 2+ bilaterally  No ankle clonus  Coordination: Finger nose finger intact bilaterally, no apparent dysmetria, ataxia or tremor noted  Gait: steady casual and tandem gait  Romberg Negative  Pertinent lab results: none available      Imaging:   [x] CT: [x] Normal per report on 12/11/2018         Review of Systems:   ROS obtained by medical assistant Personally reviewed and updated if indicated  Review of Systems   Constitutional: Positive for fatigue  Negative for appetite change and fever  HENT: Negative  Negative for hearing loss, tinnitus, trouble swallowing and voice change  Eyes: Negative  Negative for photophobia and pain  Respiratory: Negative  Negative for shortness of breath  Cardiovascular: Negative  Negative for palpitations  Gastrointestinal: Negative  Negative for nausea and vomiting  Endocrine: Negative  Negative for cold intolerance and heat intolerance  Genitourinary: Negative  Negative for dysuria, frequency and urgency  Musculoskeletal: Negative  Negative for myalgias and neck pain  Skin: Negative  Negative for rash  Neurological: Positive for headaches   Negative for dizziness, tremors, seizures, syncope, facial asymmetry, speech difficulty, weakness, light-headedness and numbness  Hematological: Negative  Does not bruise/bleed easily  Psychiatric/Behavioral: Positive for confusion and sleep disturbance  Negative for hallucinations  I have spent 60 minutes with Patient and family today in which greater than 50% of this time was spent in counseling/coordination of care regarding Prognosis, Risks and benefits of tx options, Intructions for management, Patient and family education, Importance of tx compliance, Risk factor reductions and Impressions        Author:  Casey Carrillo MD   Fellowship trained Concussion Specialist

## 2019-01-25 ENCOUNTER — TELEPHONE (OUTPATIENT)
Dept: NEUROLOGY | Facility: CLINIC | Age: 44
End: 2019-01-25

## 2019-01-25 NOTE — TELEPHONE ENCOUNTER
I will review the labs when they arrive, however if done within the past month not related to concussion certainly since the pathophysiology of concussion is typically 2 weeks  Eosinophillia is not uncommon and is often an incidental finding on blood work  Certainly his primary doctor will likely repeat the test in the future and may send more testing but in about 50% of cases a source for eosinophilia is not identified despite exhaustive evaluation

## 2019-01-25 NOTE — TELEPHONE ENCOUNTER
Patient is requesting to speak to Dr Rhianna Emanuel  States he was reviewing his labs ordered by his PCP and noticed his absolute eosinophils, were elevated (he doesn't know the exact number)   He is questioning if this increased level correlates with his concussion  He is just curious  He will request his PCPs to send labs to us

## 2019-03-31 ENCOUNTER — OFFICE VISIT (OUTPATIENT)
Dept: URGENT CARE | Facility: CLINIC | Age: 44
End: 2019-03-31
Payer: COMMERCIAL

## 2019-03-31 VITALS
SYSTOLIC BLOOD PRESSURE: 102 MMHG | OXYGEN SATURATION: 96 % | HEART RATE: 75 BPM | RESPIRATION RATE: 16 BRPM | BODY MASS INDEX: 26.68 KG/M2 | HEIGHT: 67 IN | WEIGHT: 170 LBS | DIASTOLIC BLOOD PRESSURE: 68 MMHG | TEMPERATURE: 97.3 F

## 2019-03-31 DIAGNOSIS — J06.9 VIRAL URI: Primary | ICD-10-CM

## 2019-03-31 PROCEDURE — 99213 OFFICE O/P EST LOW 20 MIN: CPT | Performed by: PHYSICIAN ASSISTANT

## 2019-03-31 RX ORDER — BENZONATATE 200 MG/1
200 CAPSULE ORAL 3 TIMES DAILY PRN
Qty: 20 CAPSULE | Refills: 0 | Status: SHIPPED | OUTPATIENT
Start: 2019-03-31 | End: 2019-08-17 | Stop reason: ALTCHOICE

## 2019-03-31 RX ORDER — FLUTICASONE PROPIONATE 50 MCG
2 SPRAY, SUSPENSION (ML) NASAL DAILY
Qty: 16 G | Refills: 0 | Status: SHIPPED | OUTPATIENT
Start: 2019-03-31 | End: 2019-08-17 | Stop reason: ALTCHOICE

## 2019-08-17 ENCOUNTER — APPOINTMENT (EMERGENCY)
Dept: CT IMAGING | Facility: HOSPITAL | Age: 44
End: 2019-08-17
Payer: COMMERCIAL

## 2019-08-17 ENCOUNTER — HOSPITAL ENCOUNTER (EMERGENCY)
Facility: HOSPITAL | Age: 44
Discharge: HOME | End: 2019-08-17
Attending: EMERGENCY MEDICINE
Payer: COMMERCIAL

## 2019-08-17 VITALS
SYSTOLIC BLOOD PRESSURE: 106 MMHG | OXYGEN SATURATION: 98 % | RESPIRATION RATE: 18 BRPM | HEART RATE: 60 BPM | TEMPERATURE: 97.9 F | DIASTOLIC BLOOD PRESSURE: 62 MMHG

## 2019-08-17 DIAGNOSIS — N20.0 KIDNEY STONE: Primary | ICD-10-CM

## 2019-08-17 LAB
ALBUMIN SERPL BCP-MCNC: 4.1 G/DL (ref 3.5–5)
ALP SERPL-CCNC: 96 U/L (ref 46–116)
ALT SERPL W P-5'-P-CCNC: 21 U/L (ref 12–78)
ANION GAP SERPL CALCULATED.3IONS-SCNC: 15 MMOL/L (ref 4–13)
AST SERPL W P-5'-P-CCNC: 22 U/L (ref 5–45)
BACTERIA UR QL AUTO: ABNORMAL /HPF
BASOPHILS # BLD AUTO: 0.04 THOUSANDS/ΜL (ref 0–0.1)
BASOPHILS NFR BLD AUTO: 0 % (ref 0–1)
BILIRUB SERPL-MCNC: 0.9 MG/DL (ref 0.2–1)
BILIRUB UR QL STRIP: NEGATIVE
BUN SERPL-MCNC: 20 MG/DL (ref 5–25)
CALCIUM SERPL-MCNC: 9.7 MG/DL (ref 8.3–10.1)
CHLORIDE SERPL-SCNC: 104 MMOL/L (ref 100–108)
CLARITY UR: ABNORMAL
CO2 SERPL-SCNC: 21 MMOL/L (ref 21–32)
COLOR UR: YELLOW
CREAT SERPL-MCNC: 1.34 MG/DL (ref 0.6–1.3)
EOSINOPHIL # BLD AUTO: 0.03 THOUSAND/ΜL (ref 0–0.61)
EOSINOPHIL NFR BLD AUTO: 0 % (ref 0–6)
ERYTHROCYTE [DISTWIDTH] IN BLOOD BY AUTOMATED COUNT: 12.2 % (ref 11.6–15.1)
GFR SERPL CREATININE-BSD FRML MDRD: 63 ML/MIN/1.73SQ M
GLUCOSE SERPL-MCNC: 127 MG/DL (ref 65–140)
GLUCOSE UR STRIP-MCNC: NEGATIVE MG/DL
HCT VFR BLD AUTO: 45.7 % (ref 36.5–49.3)
HGB BLD-MCNC: 15.8 G/DL (ref 12–17)
HGB UR QL STRIP.AUTO: ABNORMAL
IMM GRANULOCYTES # BLD AUTO: 0.05 THOUSAND/UL (ref 0–0.2)
IMM GRANULOCYTES NFR BLD AUTO: 0 % (ref 0–2)
KETONES UR STRIP-MCNC: ABNORMAL MG/DL
LEUKOCYTE ESTERASE UR QL STRIP: NEGATIVE
LIPASE SERPL-CCNC: 175 U/L (ref 73–393)
LYMPHOCYTES # BLD AUTO: 1.2 THOUSANDS/ΜL (ref 0.6–4.47)
LYMPHOCYTES NFR BLD AUTO: 8 % (ref 14–44)
MCH RBC QN AUTO: 31.3 PG (ref 26.8–34.3)
MCHC RBC AUTO-ENTMCNC: 34.6 G/DL (ref 31.4–37.4)
MCV RBC AUTO: 91 FL (ref 82–98)
MONOCYTES # BLD AUTO: 1.1 THOUSAND/ΜL (ref 0.17–1.22)
MONOCYTES NFR BLD AUTO: 7 % (ref 4–12)
NEUTROPHILS # BLD AUTO: 13.11 THOUSANDS/ΜL (ref 1.85–7.62)
NEUTS SEG NFR BLD AUTO: 85 % (ref 43–75)
NITRITE UR QL STRIP: NEGATIVE
NON-SQ EPI CELLS URNS QL MICRO: ABNORMAL /HPF
NRBC BLD AUTO-RTO: 0 /100 WBCS
PH UR STRIP.AUTO: 8.5 [PH]
PLATELET # BLD AUTO: 213 THOUSANDS/UL (ref 149–390)
PMV BLD AUTO: 10.2 FL (ref 8.9–12.7)
POTASSIUM SERPL-SCNC: 3.6 MMOL/L (ref 3.5–5.3)
PROT SERPL-MCNC: 7.7 G/DL (ref 6.4–8.2)
PROT UR STRIP-MCNC: ABNORMAL MG/DL
RBC # BLD AUTO: 5.05 MILLION/UL (ref 3.88–5.62)
RBC #/AREA URNS AUTO: ABNORMAL /HPF
SODIUM SERPL-SCNC: 140 MMOL/L (ref 136–145)
SP GR UR STRIP.AUTO: 1.01 (ref 1–1.03)
UROBILINOGEN UR QL STRIP.AUTO: 0.2 E.U./DL
WBC # BLD AUTO: 15.53 THOUSAND/UL (ref 4.31–10.16)
WBC #/AREA URNS AUTO: ABNORMAL /HPF

## 2019-08-17 PROCEDURE — 96374 THER/PROPH/DIAG INJ IV PUSH: CPT

## 2019-08-17 PROCEDURE — 36415 COLL VENOUS BLD VENIPUNCTURE: CPT

## 2019-08-17 PROCEDURE — 81001 URINALYSIS AUTO W/SCOPE: CPT | Performed by: EMERGENCY MEDICINE

## 2019-08-17 PROCEDURE — 74176 CT ABD & PELVIS W/O CONTRAST: CPT

## 2019-08-17 PROCEDURE — 99284 EMERGENCY DEPT VISIT MOD MDM: CPT

## 2019-08-17 PROCEDURE — 83690 ASSAY OF LIPASE: CPT | Performed by: EMERGENCY MEDICINE

## 2019-08-17 PROCEDURE — 99284 EMERGENCY DEPT VISIT MOD MDM: CPT | Performed by: EMERGENCY MEDICINE

## 2019-08-17 PROCEDURE — 85025 COMPLETE CBC W/AUTO DIFF WBC: CPT | Performed by: EMERGENCY MEDICINE

## 2019-08-17 PROCEDURE — 80053 COMPREHEN METABOLIC PANEL: CPT | Performed by: EMERGENCY MEDICINE

## 2019-08-17 PROCEDURE — 96375 TX/PRO/DX INJ NEW DRUG ADDON: CPT

## 2019-08-17 RX ORDER — ONDANSETRON 2 MG/ML
4 INJECTION INTRAMUSCULAR; INTRAVENOUS ONCE
Status: COMPLETED | OUTPATIENT
Start: 2019-08-17 | End: 2019-08-17

## 2019-08-17 RX ORDER — MORPHINE SULFATE 4 MG/ML
4 INJECTION, SOLUTION INTRAMUSCULAR; INTRAVENOUS ONCE
Status: COMPLETED | OUTPATIENT
Start: 2019-08-17 | End: 2019-08-17

## 2019-08-17 RX ORDER — MORPHINE SULFATE 15 MG/1
7.5 TABLET ORAL EVERY 4 HOURS PRN
Qty: 9 TABLET | Refills: 0 | Status: SHIPPED | OUTPATIENT
Start: 2019-08-17 | End: 2019-08-20

## 2019-08-17 RX ORDER — PANTOPRAZOLE SODIUM 40 MG/1
40 TABLET, DELAYED RELEASE ORAL DAILY
COMMUNITY
Start: 2019-08-06 | End: 2020-08-05

## 2019-08-17 RX ORDER — KETOROLAC TROMETHAMINE 30 MG/ML
15 INJECTION, SOLUTION INTRAMUSCULAR; INTRAVENOUS ONCE
Status: COMPLETED | OUTPATIENT
Start: 2019-08-17 | End: 2019-08-17

## 2019-08-17 RX ADMIN — KETOROLAC TROMETHAMINE 15 MG: 30 INJECTION, SOLUTION INTRAMUSCULAR at 11:01

## 2019-08-17 RX ADMIN — MORPHINE SULFATE 4 MG: 4 INJECTION INTRAVENOUS at 12:03

## 2019-08-17 RX ADMIN — ONDANSETRON 4 MG: 2 INJECTION INTRAMUSCULAR; INTRAVENOUS at 10:59

## 2019-08-19 ENCOUNTER — HOSPITAL ENCOUNTER (EMERGENCY)
Facility: HOSPITAL | Age: 44
Discharge: HOME | End: 2019-08-19
Attending: EMERGENCY MEDICINE | Admitting: EMERGENCY MEDICINE
Payer: COMMERCIAL

## 2019-08-19 VITALS
BODY MASS INDEX: 26.37 KG/M2 | HEIGHT: 67 IN | DIASTOLIC BLOOD PRESSURE: 59 MMHG | HEART RATE: 54 BPM | SYSTOLIC BLOOD PRESSURE: 99 MMHG | WEIGHT: 167.99 LBS | RESPIRATION RATE: 16 BRPM | TEMPERATURE: 98.2 F | OXYGEN SATURATION: 98 %

## 2019-08-19 DIAGNOSIS — N20.1 URETEROLITHIASIS: ICD-10-CM

## 2019-08-19 DIAGNOSIS — N23 URETERAL COLIC: Primary | ICD-10-CM

## 2019-08-19 LAB
ANION GAP SERPL CALCULATED.3IONS-SCNC: 8 MMOL/L (ref 4–13)
BACTERIA UR QL AUTO: ABNORMAL /HPF
BASOPHILS # BLD AUTO: 0.02 THOUSANDS/ΜL (ref 0–0.1)
BASOPHILS NFR BLD AUTO: 0 % (ref 0–1)
BILIRUB UR QL STRIP: NEGATIVE
BUN SERPL-MCNC: 19 MG/DL (ref 5–25)
CALCIUM SERPL-MCNC: 7.9 MG/DL (ref 8.3–10.1)
CHLORIDE SERPL-SCNC: 100 MMOL/L (ref 100–108)
CLARITY UR: CLEAR
CO2 SERPL-SCNC: 25 MMOL/L (ref 21–32)
COLOR UR: YELLOW
CREAT SERPL-MCNC: 1.17 MG/DL (ref 0.6–1.3)
EOSINOPHIL # BLD AUTO: 0.01 THOUSAND/ΜL (ref 0–0.61)
EOSINOPHIL NFR BLD AUTO: 0 % (ref 0–6)
ERYTHROCYTE [DISTWIDTH] IN BLOOD BY AUTOMATED COUNT: 12.4 % (ref 11.6–15.1)
GFR SERPL CREATININE-BSD FRML MDRD: 74 ML/MIN/1.73SQ M
GLUCOSE SERPL-MCNC: 104 MG/DL (ref 65–140)
GLUCOSE UR STRIP-MCNC: NEGATIVE MG/DL
HCT VFR BLD AUTO: 39.5 % (ref 36.5–49.3)
HGB BLD-MCNC: 13.1 G/DL (ref 12–17)
HGB UR QL STRIP.AUTO: ABNORMAL
IMM GRANULOCYTES # BLD AUTO: 0.05 THOUSAND/UL (ref 0–0.2)
IMM GRANULOCYTES NFR BLD AUTO: 0 % (ref 0–2)
KETONES UR STRIP-MCNC: ABNORMAL MG/DL
LEUKOCYTE ESTERASE UR QL STRIP: NEGATIVE
LYMPHOCYTES # BLD AUTO: 0.77 THOUSANDS/ΜL (ref 0.6–4.47)
LYMPHOCYTES NFR BLD AUTO: 7 % (ref 14–44)
MCH RBC QN AUTO: 31.7 PG (ref 26.8–34.3)
MCHC RBC AUTO-ENTMCNC: 33.2 G/DL (ref 31.4–37.4)
MCV RBC AUTO: 96 FL (ref 82–98)
MONOCYTES # BLD AUTO: 1.34 THOUSAND/ΜL (ref 0.17–1.22)
MONOCYTES NFR BLD AUTO: 12 % (ref 4–12)
MUCOUS THREADS UR QL AUTO: ABNORMAL
NEUTROPHILS # BLD AUTO: 9.45 THOUSANDS/ΜL (ref 1.85–7.62)
NEUTS SEG NFR BLD AUTO: 81 % (ref 43–75)
NITRITE UR QL STRIP: NEGATIVE
NON-SQ EPI CELLS URNS QL MICRO: ABNORMAL /HPF
NRBC BLD AUTO-RTO: 0 /100 WBCS
PH UR STRIP.AUTO: 6 [PH]
PLATELET # BLD AUTO: 156 THOUSANDS/UL (ref 149–390)
PMV BLD AUTO: 10.4 FL (ref 8.9–12.7)
POTASSIUM SERPL-SCNC: 3.3 MMOL/L (ref 3.5–5.3)
PROT UR STRIP-MCNC: NEGATIVE MG/DL
RBC # BLD AUTO: 4.13 MILLION/UL (ref 3.88–5.62)
RBC #/AREA URNS AUTO: ABNORMAL /HPF
SODIUM SERPL-SCNC: 133 MMOL/L (ref 136–145)
SP GR UR STRIP.AUTO: 1.02 (ref 1–1.03)
UROBILINOGEN UR QL STRIP.AUTO: 0.2 E.U./DL
WBC # BLD AUTO: 11.64 THOUSAND/UL (ref 4.31–10.16)
WBC #/AREA URNS AUTO: ABNORMAL /HPF

## 2019-08-19 PROCEDURE — 99285 EMERGENCY DEPT VISIT HI MDM: CPT

## 2019-08-19 PROCEDURE — 99284 EMERGENCY DEPT VISIT MOD MDM: CPT | Performed by: NURSE PRACTITIONER

## 2019-08-19 PROCEDURE — 36415 COLL VENOUS BLD VENIPUNCTURE: CPT | Performed by: NURSE PRACTITIONER

## 2019-08-19 PROCEDURE — 96374 THER/PROPH/DIAG INJ IV PUSH: CPT

## 2019-08-19 PROCEDURE — 85025 COMPLETE CBC W/AUTO DIFF WBC: CPT | Performed by: NURSE PRACTITIONER

## 2019-08-19 PROCEDURE — 81001 URINALYSIS AUTO W/SCOPE: CPT | Performed by: NURSE PRACTITIONER

## 2019-08-19 PROCEDURE — 80048 BASIC METABOLIC PNL TOTAL CA: CPT | Performed by: NURSE PRACTITIONER

## 2019-08-19 PROCEDURE — 96361 HYDRATE IV INFUSION ADD-ON: CPT

## 2019-08-19 RX ORDER — ONDANSETRON 2 MG/ML
1 INJECTION INTRAMUSCULAR; INTRAVENOUS ONCE
Status: COMPLETED | OUTPATIENT
Start: 2019-08-19 | End: 2019-08-19

## 2019-08-19 RX ORDER — NAPROXEN 500 MG/1
500 TABLET ORAL 2 TIMES DAILY WITH MEALS
Qty: 10 TABLET | Refills: 0 | Status: SHIPPED | OUTPATIENT
Start: 2019-08-19 | End: 2021-02-24 | Stop reason: ALTCHOICE

## 2019-08-19 RX ORDER — ONDANSETRON 2 MG/ML
1 INJECTION INTRAMUSCULAR; INTRAVENOUS ONCE
Status: DISCONTINUED | OUTPATIENT
Start: 2019-08-19 | End: 2019-08-19

## 2019-08-19 RX ORDER — OXYCODONE HYDROCHLORIDE 5 MG/1
5 TABLET ORAL ONCE
Status: COMPLETED | OUTPATIENT
Start: 2019-08-19 | End: 2019-08-19

## 2019-08-19 RX ORDER — KETOROLAC TROMETHAMINE 30 MG/ML
15 INJECTION, SOLUTION INTRAMUSCULAR; INTRAVENOUS ONCE
Status: DISCONTINUED | OUTPATIENT
Start: 2019-08-19 | End: 2019-08-19

## 2019-08-19 RX ORDER — ONDANSETRON 2 MG/ML
4 INJECTION INTRAMUSCULAR; INTRAVENOUS ONCE
Status: DISCONTINUED | OUTPATIENT
Start: 2019-08-19 | End: 2019-08-19 | Stop reason: HOSPADM

## 2019-08-19 RX ORDER — HYDROMORPHONE HCL/PF 1 MG/ML
1 SYRINGE (ML) INJECTION ONCE
Status: COMPLETED | OUTPATIENT
Start: 2019-08-19 | End: 2019-08-19

## 2019-08-19 RX ORDER — ONDANSETRON 4 MG/1
4 TABLET, ORALLY DISINTEGRATING ORAL EVERY 8 HOURS PRN
Qty: 9 TABLET | Refills: 0 | Status: SHIPPED | OUTPATIENT
Start: 2019-08-19 | End: 2021-02-24 | Stop reason: ALTCHOICE

## 2019-08-19 RX ORDER — OXYCODONE HYDROCHLORIDE AND ACETAMINOPHEN 5; 325 MG/1; MG/1
1 TABLET ORAL EVERY 6 HOURS PRN
Qty: 20 TABLET | Refills: 0 | Status: SHIPPED | OUTPATIENT
Start: 2019-08-19 | End: 2021-02-24 | Stop reason: ALTCHOICE

## 2019-08-19 RX ADMIN — SODIUM CHLORIDE 1000 ML: 0.9 INJECTION, SOLUTION INTRAVENOUS at 08:23

## 2019-08-19 RX ADMIN — HYDROMORPHONE HYDROCHLORIDE 1 MG: 1 INJECTION, SOLUTION INTRAMUSCULAR; INTRAVENOUS; SUBCUTANEOUS at 08:24

## 2019-08-19 RX ADMIN — OXYCODONE HYDROCHLORIDE 5 MG: 5 TABLET ORAL at 10:29

## 2019-08-19 NOTE — ED PROVIDER NOTES
History  Chief Complaint   Patient presents with    Flank Pain     diagnosed with right 4mm kidney stone Saturday  pain increased  PO pain medications not helping     57-year-old male patient presents here with right-sided flank pain  He has a known 4 mm UVJ stone diagnosed this past Friday  He has been taking 7 5 mg of morphine without any relief  He has been nauseous and vomiting over the weekend and has not really done anything aside from lay around  Admits that he is probably dehydrated because he has been so uncomfortable  He denies fevers  Prior to Admission Medications   Prescriptions Last Dose Informant Patient Reported? Taking? ALPRAZolam (XANAX) 0 5 mg tablet   Yes No   Sig: Take 0 5 mg by mouth Three times daily as needed   Multiple Vitamin (MULTI-VITAMIN DAILY PO)   Yes No   Sig: Take 1 tablet by mouth daily   morphine (MSIR) 15 mg tablet   No No   Sig: Take 0 5 tablets (7 5 mg total) by mouth every 4 (four) hours as needed for severe pain for up to 3 daysMax Daily Amount: 45 mg   pantoprazole (PROTONIX) 40 mg tablet   Yes No   Sig: Take 40 mg by mouth daily      Facility-Administered Medications: None       Past Medical History:   Diagnosis Date    Anxiety     Hyperlipidemia        Past Surgical History:   Procedure Laterality Date    NO PAST SURGERIES         Family History   Problem Relation Age of Onset    No Known Problems Mother     Hyperlipidemia Father     Alzheimer's disease Maternal Grandfather     Stomach cancer Paternal Grandfather      I have reviewed and agree with the history as documented  Social History     Tobacco Use    Smoking status: Never Smoker    Smokeless tobacco: Current User   Substance Use Topics    Alcohol use: Yes     Comment: social use    Drug use: No        Review of Systems   Constitutional: Negative for diaphoresis, fatigue and fever  HENT: Negative for congestion, ear pain, nosebleeds and sore throat      Eyes: Negative for photophobia, pain, discharge and visual disturbance  Respiratory: Negative for cough, choking, chest tightness, shortness of breath and wheezing  Cardiovascular: Negative for chest pain and palpitations  Gastrointestinal: Positive for nausea  Negative for abdominal distention, abdominal pain, diarrhea and vomiting  Genitourinary: Positive for flank pain  Negative for dysuria and frequency  Musculoskeletal: Negative for back pain, gait problem and joint swelling  Skin: Negative for color change and rash  Neurological: Negative for dizziness, syncope and headaches  Psychiatric/Behavioral: Negative for behavioral problems and confusion  The patient is not nervous/anxious  All other systems reviewed and are negative  Physical Exam  Physical Exam   Constitutional: He is oriented to person, place, and time  He appears well-developed and well-nourished  HENT:   Head: Normocephalic and atraumatic  Eyes: Pupils are equal, round, and reactive to light  Neck: Normal range of motion  Neck supple  Cardiovascular: Normal rate, regular rhythm, normal heart sounds and normal pulses  PMI is not displaced  Pulmonary/Chest: Effort normal and breath sounds normal  No respiratory distress  Abdominal: Soft  He exhibits no distension  There is tenderness  There is CVA tenderness (Right-sided)  There is no guarding  Musculoskeletal: Normal range of motion  Lymphadenopathy:     He has no cervical adenopathy  Neurological: He is alert and oriented to person, place, and time  Skin: Skin is warm and dry  No rash noted  He is not diaphoretic  No pallor  Psychiatric: He has a normal mood and affect  Vitals reviewed        Vital Signs  ED Triage Vitals [08/19/19 0800]   Temperature Pulse Respirations Blood Pressure SpO2   98 2 °F (36 8 °C) 56 18 131/73 99 %      Temp Source Heart Rate Source Patient Position - Orthostatic VS BP Location FiO2 (%)   Oral Monitor Sitting Right arm --      Pain Score       8 Vitals:    08/19/19 0800 08/19/19 0801 08/19/19 1001   BP: 131/73 131/73 99/59   Pulse: 56 60 (!) 54   Patient Position - Orthostatic VS: Sitting  Sitting         Visual Acuity      ED Medications  Medications   ondansetron (ZOFRAN) injection 4 mg (0 mg Intravenous Hold 8/19/19 0824)   oxyCODONE (ROXICODONE) IR tablet 5 mg (has no administration in time range)   ondansetron (FOR EMS ONLY) (ZOFRAN) 4 mg/2 mL injection 4 mg (0 mg Does not apply Given to EMS 8/19/19 0802)    EMS REPLENISHMENT MED ( Does not apply Given to EMS 8/19/19 0808)   sodium chloride 0 9 % bolus 1,000 mL (1,000 mL Intravenous New Bag 8/19/19 0823)   HYDROmorphone (DILAUDID) injection 1 mg (1 mg Intravenous Given 8/19/19 0824)       Diagnostic Studies  Results Reviewed     Procedure Component Value Units Date/Time    UA w Reflex to Microscopic w Reflex to Culture [[id number]]  (Abnormal) Collected:  08/19/19 1000    Lab Status:  Final result Specimen:  Urine, Clean Catch Updated:  08/19/19 1007     Color, UA Yellow     Clarity, UA Clear     Specific Gravity, UA 1 025     pH, UA 6 0     Leukocytes, UA Negative     Nitrite, UA Negative     Protein, UA Negative mg/dl      Glucose, UA Negative mg/dl      Ketones, UA Trace mg/dl      Urobilinogen, UA 0 2 E U /dl      Bilirubin, UA Negative     Blood, UA Moderate    Urine Microscopic [[id number]] Collected:  08/19/19 1000    Lab Status:   In process Specimen:  Urine, Clean Catch Updated:  08/19/19 3674    Basic metabolic panel [[id number]]  (Abnormal) Collected:  08/19/19 0850    Lab Status:  Final result Specimen:  Blood from Arm, Left Updated:  08/19/19 0844     Sodium 133 mmol/L      Potassium 3 3 mmol/L      Chloride 100 mmol/L      CO2 25 mmol/L      ANION GAP 8 mmol/L      BUN 19 mg/dL      Creatinine 1 17 mg/dL      Glucose 104 mg/dL      Calcium 7 9 mg/dL      eGFR 74 ml/min/1 73sq m     Narrative:       [place] guidelines for Chronic Kidney Disease (CKD):    Stage 1 with normal or high GFR (GFR > 90 mL/min/1 73 square meters)    Stage 2 Mild CKD (GFR = 60-89 mL/min/1 73 square meters)    Stage 3A Moderate CKD (GFR = 45-59 mL/min/1 73 square meters)    Stage 3B Moderate CKD (GFR = 30-44 mL/min/1 73 square meters)    Stage 4 Severe CKD (GFR = 15-29 mL/min/1 73 square meters)    Stage 5 End Stage CKD (GFR <15 mL/min/1 73 square meters)  Note: GFR calculation is accurate only with a steady state creatinine    CBC and differential [[id number]]  (Abnormal) Collected:  08/19/19 0850    Lab Status:  Final result Specimen:  Blood from Arm, Left Updated:  08/19/19 0854     WBC 11 64 Thousand/uL      RBC 4 13 Million/uL      Hemoglobin 13 1 g/dL      Hematocrit 39 5 %      MCV 96 fL      MCH 31 7 pg      MCHC 33 2 g/dL      RDW 12 4 %      MPV 10 4 fL      Platelets 641 Thousands/uL      nRBC 0 /100 WBCs      Neutrophils Relative 81 %      Immat GRANS % 0 %      Lymphocytes Relative 7 %      Monocytes Relative 12 %      Eosinophils Relative 0 %      Basophils Relative 0 %      Neutrophils Absolute 9 45 Thousands/µL      Immature Grans Absolute 0 05 Thousand/uL      Lymphocytes Absolute 0 77 Thousands/µL      Monocytes Absolute 1 34 Thousand/µL      Eosinophils Absolute 0 01 Thousand/µL      Basophils Absolute 0 02 Thousands/µL                  No orders to display              Procedures  Procedures       ED Course                               MDM  Number of Diagnoses or Management Options  Ureteral colic: new and requires workup  Ureterolithiasis: new and requires workup  Diagnosis management comments: Case was discussed with Urology  Tyson discharging the patient home with changed was pain control regimen         Amount and/or Complexity of Data Reviewed  Clinical lab tests: reviewed and ordered  Tests in the radiology section of CPT®: reviewed  Independent visualization of images, tracings, or specimens: yes    Patient Progress  Patient progress: stable      Disposition  Final diagnoses:   Ureteral colic   Ureterolithiasis     Time reflects when diagnosis was documented in both MDM as applicable and the Disposition within this note     Time User Action Codes Description Comment    8/19/2019 10:13 AM Janine Vaca Add [U42] Ureteral colic     9/93/2087 82:08 AM Janine Lio Add [N20 1] Ureterolithiasis       ED Disposition     ED Disposition Condition Date/Time Comment    Discharge Stable Mon Aug 19, 2019 10:13 AM Andreea Raymond discharge to home/self care  Follow-up Information     Follow up With Specialties Details Why Contact Info Additional [address] [facility] [place] [facility] Schedule an appointment as soon as possible for a visit  For Continued Evaluation [id number]  [place] [address] [facility],[address] [address]  701  [facility] [place], [address],  [place], [place], [address]          Patient's Medications   Discharge Prescriptions    NAPROXEN (NAPROSYN) 500 MG TABLET    Take 1 tablet (500 mg total) by mouth 2 (two) times a day with meals for 5 days       Start Date: 8/19/2019 End Date: 8/24/2019       Order Dose: 500 mg       Quantity: 10 tablet    Refills: 0    ONDANSETRON (ZOFRAN-ODT) 4 MG DISINTEGRATING TABLET    Take 1 tablet (4 mg total) by mouth every 8 (eight) hours as needed for nausea or vomiting for up to 3 days       Start Date: 8/19/2019 End Date: 8/22/2019       Order Dose: 4 mg       Quantity: 9 tablet    Refills: 0    OXYCODONE-ACETAMINOPHEN (PERCOCET) 5-325 MG PER TABLET    Take 1 tablet by mouth every 6 (six) hours as needed for severe pain for up to 20 dosesMax Daily Amount: 4 tablets       Start Date: 8/19/2019 End Date: --       Order Dose: 1 tablet       Quantity: 20 tablet    Refills: 0     No discharge procedures on file      ED Provider  Electronically Signed by           SEBLE Doran  08/19/19 1016

## 2019-08-19 NOTE — ED NOTES
Patient left department with family  D/C instructions given  Understanding verbalized       Joey Cosme RN  08/19/19 0280

## 2019-08-21 ENCOUNTER — TELEPHONE (OUTPATIENT)
Dept: UROLOGY | Facility: MEDICAL CENTER | Age: 44
End: 2019-08-21

## 2019-08-21 NOTE — TELEPHONE ENCOUNTER
Patient seen in the ED of 8/17 and 1/58 for ureteral colic and obstructing kidney stone  CT results demonstrate: 4 mm obstructing calculus at the right ureterovesical junction resulting in mild right hydroureteronephrosis  Called Patient and scheduled [place] [facility]  He is to arrive 15 minutes early for paperwork and registration  He does not know if he passed the stone yet, he reports severe pain has subsided but he is still uncomfortable

## 2019-08-21 NOTE — TELEPHONE ENCOUNTER
Please Triage - ER Follow Up  First Available Location    What is the reason for the patients appointment? Kidney Stone    Do we accept the patient's insurance or is the patient Self-Pay? [employer] PPO    Has the patient had any previous urologist(s)? Yes  [facility] Dr Krunal Quintanilla    Has the patients records been requested? Yes,  however patient stated it was too long ago to request them  ER notes/labs/imaging in Epic    Has the patient had any outside testing done? [facility]    What is the patients location preference for an office visit? [place] closest to [place] but requests first available location    Does the patient have a personal history of cancer?   No    Patient can be reached at [phone]

## 2019-08-22 ENCOUNTER — OFFICE VISIT (OUTPATIENT)
Dept: UROLOGY | Facility: CLINIC | Age: 44
End: 2019-08-22
Payer: COMMERCIAL

## 2019-08-22 VITALS
SYSTOLIC BLOOD PRESSURE: 104 MMHG | DIASTOLIC BLOOD PRESSURE: 64 MMHG | HEART RATE: 60 BPM | HEIGHT: 67 IN | WEIGHT: 167 LBS | BODY MASS INDEX: 26.21 KG/M2

## 2019-08-22 DIAGNOSIS — N20.0 NEPHROLITHIASIS: Primary | ICD-10-CM

## 2019-08-22 LAB
SL AMB  POCT GLUCOSE, UA: ABNORMAL
SL AMB LEUKOCYTE ESTERASE,UA: ABNORMAL
SL AMB POCT BILIRUBIN,UA: ABNORMAL
SL AMB POCT BLOOD,UA: ABNORMAL
SL AMB POCT CLARITY,UA: CLEAR
SL AMB POCT COLOR,UA: YELLOW
SL AMB POCT KETONES,UA: ABNORMAL
SL AMB POCT NITRITE,UA: ABNORMAL
SL AMB POCT PH,UA: 5
SL AMB POCT SPECIFIC GRAVITY,UA: 1.03
SL AMB POCT URINE PROTEIN: ABNORMAL
SL AMB POCT UROBILINOGEN: ABNORMAL

## 2019-08-22 PROCEDURE — 99203 OFFICE O/P NEW LOW 30 MIN: CPT | Performed by: PHYSICIAN ASSISTANT

## 2019-08-22 PROCEDURE — 81002 URINALYSIS NONAUTO W/O SCOPE: CPT | Performed by: PHYSICIAN ASSISTANT

## 2019-08-22 RX ORDER — TAMSULOSIN HYDROCHLORIDE 0.4 MG/1
0.4 CAPSULE ORAL
Qty: 20 CAPSULE | Refills: 1 | Status: SHIPPED | OUTPATIENT
Start: 2019-08-22 | End: 2021-02-24 | Stop reason: ALTCHOICE

## 2019-08-22 NOTE — PROGRESS NOTES
1  Nephrolithiasis  POCT urine dip    US kidney and bladder    tamsulosin (FLOMAX) 0 4 mg         Assessment and plan:       1  4 mm obstructing right UVJ calculus   2  mild right hydroureteronephrosis  Patient starting to have symptom improvement  Continue hydration, tylenol/ibuprofen, zofran, and percocet as needed  Will f/u in 1 week with US prior to visit to ensure resolution  Patient is aware to contact us with any remaining signs and symptoms passing stone  ER precautions provided    Tisha Sidhu PA-C      Chief Complaint     New patient kidney stone    History of Present Illness     Mike Kaurpool is a 53 y o  male presenting as a new patient for emergency department follow-up follow-up with a 4 mm kidney stone  Patient was in the emergency department 819 19 in regards to right flank pain  He had previously been diagnosed with a kidney stone at another medical [facility]  Patient had a repeat CT stone study (08/17/2019) which confirmed a 4 mm obstructing right UVJ calculus with mild right hydroureteronephrosis  WBC 11 64, creatinine 1 17, urine leukocyte and nitrite negative  Patient reports that over the CT course of the past few days has improving  Patient is taking Tylenol and ibuprofen intermittently as needed  Having mild right lower quadrant pain which patient feels is tolerable  Denies any residual right flank pain  Denies any dysuria, gross hematuria, nausea, vomiting fevers, or chills  Urine dip in the today is leukocyte and nitrite positive  Laboratory     Lab Results   Component Value Date    CREATININE 1 17 08/19/2019       Review of Systems     Review of Systems   Constitutional: Negative for activity change, appetite change, chills, diaphoresis, fatigue, fever and unexpected weight change  Respiratory: Negative for chest tightness and shortness of breath  Cardiovascular: Negative for chest pain, palpitations and leg swelling     Gastrointestinal: Negative for abdominal distention, abdominal pain, constipation, diarrhea, nausea and vomiting  Genitourinary: Negative for decreased urine volume, difficulty urinating, dysuria, enuresis, flank pain, frequency, genital sores, hematuria and urgency  Musculoskeletal: Negative for back pain, gait problem and myalgias  Skin: Negative for color change, pallor, rash and wound  Psychiatric/Behavioral: Negative for behavioral problems  The patient is not nervous/anxious  Allergies     No Known Allergies    Physical Exam     Physical Exam   Constitutional: He is oriented to person, place, and time  He appears well-developed and well-nourished  No distress  HENT:   Head: Normocephalic and atraumatic  Eyes: Conjunctivae are normal    Neck: Normal range of motion  No tracheal deviation present  Pulmonary/Chest: Effort normal    Musculoskeletal: Normal range of motion  He exhibits no edema or deformity  Neurological: He is alert and oriented to person, place, and time  Skin: Skin is warm and dry  No rash noted  He is not diaphoretic  No erythema  Psychiatric: He has a normal mood and affect   His behavior is normal          Vital Signs     Vitals:    08/22/19 1026   BP: 104/64   BP Location: Left arm   Patient Position: Sitting   Cuff Size: Standard   Pulse: 60   Weight: 75 8 kg (167 lb)   Height: 5' 7" (1 702 m)         Current Medications       Current Outpatient Medications:     ALPRAZolam (XANAX) 0 5 mg tablet, Take 0 5 mg by mouth Three times daily as needed, Disp: , Rfl:     Multiple Vitamin (MULTI-VITAMIN DAILY PO), Take 1 tablet by mouth daily, Disp: , Rfl:     naproxen (NAPROSYN) 500 mg tablet, Take 1 tablet (500 mg total) by mouth 2 (two) times a day with meals for 5 days, Disp: 10 tablet, Rfl: 0    ondansetron (ZOFRAN-ODT) 4 mg disintegrating tablet, Take 1 tablet (4 mg total) by mouth every 8 (eight) hours as needed for nausea or vomiting for up to 3 days, Disp: 9 tablet, Rfl: 0   oxyCODONE-acetaminophen (PERCOCET) 5-325 mg per tablet, Take 1 tablet by mouth every 6 (six) hours as needed for severe pain for up to 20 dosesMax Daily Amount: 4 tablets, Disp: 20 tablet, Rfl: 0    pantoprazole (PROTONIX) 40 mg tablet, Take 40 mg by mouth daily, Disp: , Rfl:     tamsulosin (FLOMAX) 0 4 mg, Take 1 capsule (0 4 mg total) by mouth daily with dinner, Disp: 20 capsule, Rfl: 1      Active Problems     Patient Active Problem List   Diagnosis    Mild traumatic brain injury ([facility] [address] )    Daily headache    Anxiety disorder    Degeneration of intervertebral disc    Foot pain, bilateral    Gastroesophageal reflux disease    Lumbosacral spondylosis    Pure hypercholesterolemia         Past Medical History     Past Medical History:   Diagnosis Date    Anxiety     Hyperlipidemia          Surgical History     Past Surgical History:   Procedure Laterality Date    NO PAST SURGERIES           Family History     Family History   Problem Relation Age of Onset    No Known Problems Mother     Hyperlipidemia Father     Alzheimer's disease Maternal Grandfather     Stomach cancer Paternal Grandfather          Social History     Social History       Radiology

## 2019-08-25 NOTE — ED PROVIDER NOTES
History  Chief Complaint   Patient presents with    Flank Pain     pain to R side, onset yesterday  pt had US on tuesday that dx w/ kidney stone  + nausea/vomiting      20-year-old male presenting to the emergency department for evaluation of flank pain  Patient has had right-sided flank pain which began yesterday, diagnosed with kidney stone on ultrasound yesterday, taking some NSAIDs with moderate relief  Patient states the pain is now more severe, so she was nausea vomiting, radiating into the groin now  Denies hematuria or dysuria  Prior to Admission Medications   Prescriptions Last Dose Informant Patient Reported? Taking? ALPRAZolam (XANAX) 0 5 mg tablet Past Month at Unknown time  Yes Yes   Sig: Take 0 5 mg by mouth Three times daily as needed   Multiple Vitamin (MULTI-VITAMIN DAILY PO) 8/15/2019 at 0500  Yes No   Sig: Take 1 tablet by mouth daily   pantoprazole (PROTONIX) 40 mg tablet 8/15/2019 at 0500  Yes Yes   Sig: Take 40 mg by mouth daily      Facility-Administered Medications: None       Past Medical History:   Diagnosis Date    Anxiety     Hyperlipidemia        Past Surgical History:   Procedure Laterality Date    NO PAST SURGERIES         Family History   Problem Relation Age of Onset    No Known Problems Mother     Hyperlipidemia Father     Alzheimer's disease Maternal Grandfather     Stomach cancer Paternal Grandfather      I have reviewed and agree with the history as documented  Social History     Tobacco Use    Smoking status: Never Smoker    Smokeless tobacco: Current User   Substance Use Topics    Alcohol use: Yes     Comment: social use    Drug use: No        Review of Systems   Constitutional: Negative for appetite change, chills, fatigue and fever  HENT: Negative for sneezing and sore throat  Eyes: Negative for visual disturbance  Respiratory: Negative for cough, choking, chest tightness, shortness of breath and wheezing      Cardiovascular: Negative for chest pain and palpitations  Gastrointestinal: Negative for abdominal pain, constipation, diarrhea, nausea and vomiting  Genitourinary: Positive for flank pain  Negative for difficulty urinating and dysuria  Neurological: Negative for dizziness, weakness, light-headedness, numbness and headaches  All other systems reviewed and are negative  Physical Exam  Physical Exam   Constitutional: He is oriented to person, place, and time  He appears well-developed and well-nourished  No distress  HENT:   Head: Normocephalic and atraumatic  Mouth/Throat: Oropharynx is clear and moist    Eyes: Pupils are equal, round, and reactive to light  EOM are normal    Neck: No JVD present  No tracheal deviation present  Cardiovascular: Normal rate, regular rhythm, normal heart sounds and intact distal pulses  Exam reveals no gallop and no friction rub  No murmur heard  Pulmonary/Chest: Effort normal and breath sounds normal  No respiratory distress  He has no wheezes  He has no rales  Abdominal: Soft  Bowel sounds are normal  He exhibits no distension  There is tenderness in the right lower quadrant  There is CVA tenderness  There is no rebound and no guarding  Neurological: He is alert and oriented to person, place, and time  No cranial nerve deficit  He exhibits normal muscle tone  Skin: Skin is warm and dry  He is not diaphoretic  No pallor  Psychiatric: He has a normal mood and affect  His behavior is normal    Nursing note and vitals reviewed        Vital Signs  ED Triage Vitals   Temperature Pulse Respirations Blood Pressure SpO2   08/17/19 0940 08/17/19 0939 08/17/19 0939 08/17/19 0939 08/17/19 0939   97 9 °F (36 6 °C) 60 16 128/77 100 %      Temp Source Heart Rate Source Patient Position - Orthostatic VS BP Location FiO2 (%)   08/17/19 0940 08/17/19 0939 08/17/19 0939 08/17/19 0939 --   Oral Monitor Lying Right arm       Pain Score       08/17/19 0939       8           Vitals:    08/17/19 1133 08/17/19 1202 08/17/19 1230 08/17/19 1305   BP: 131/70 141/65 127/71 106/62   Pulse: 62 74 63 60   Patient Position - Orthostatic VS: Lying Lying Lying Lying         Visual Acuity      ED Medications  Medications   ketorolac (TORADOL) injection 15 mg (15 mg Intravenous Given 8/17/19 1101)   ondansetron (ZOFRAN) injection 4 mg (4 mg Intravenous Given 8/17/19 1059)   morphine (PF) 4 mg/mL injection 4 mg (4 mg Intravenous Given 8/17/19 1203)       Diagnostic Studies  Results Reviewed     Procedure Component Value Units Date/Time    Urine Microscopic [[id number]]  (Abnormal) Collected:  08/17/19 1231    Lab Status:  Final result Specimen:  Urine, Other Updated:  08/17/19 1249     RBC, UA 1-2 /hpf      WBC, UA 1-2 /hpf      Epithelial Cells None Seen /hpf      Bacteria, UA Moderate /hpf     UA w Reflex to Microscopic w Reflex to Culture [[id number]]  (Abnormal) Collected:  08/17/19 1231    Lab Status:  Final result Specimen:  Urine, Other Updated:  08/17/19 1239     Color, UA Yellow     Clarity, UA Cloudy     Specific Gravity, UA 1 015     pH, UA 8 5     Leukocytes, UA Negative     Nitrite, UA Negative     Protein, UA Trace mg/dl      Glucose, UA Negative mg/dl      Ketones, UA 15 (1+) mg/dl      Urobilinogen, UA 0 2 E U /dl      Bilirubin, UA Negative     Blood, UA Trace-Intact    Comprehensive metabolic panel [[id number]]  (Abnormal) Collected:  08/17/19 0958    Lab Status:  Final result Specimen:  Blood from Arm, Left Updated:  08/17/19 1019     Sodium 140 mmol/L      Potassium 3 6 mmol/L      Chloride 104 mmol/L      CO2 21 mmol/L      ANION GAP 15 mmol/L      BUN 20 mg/dL      Creatinine 1 34 mg/dL      Glucose 127 mg/dL      Calcium 9 7 mg/dL      AST 22 U/L      ALT 21 U/L      Alkaline Phosphatase 96 U/L      Total Protein 7 7 g/dL      Albumin 4 1 g/dL      Total Bilirubin 0 90 mg/dL      eGFR 63 ml/min/1 73sq m     Narrative:       [place] guidelines for Chronic Kidney Disease (CKD):    Stage 1 with normal or high GFR (GFR > 90 mL/min/1 73 square meters)    Stage 2 Mild CKD (GFR = 60-89 mL/min/1 73 square meters)    Stage 3A Moderate CKD (GFR = 45-59 mL/min/1 73 square meters)    Stage 3B Moderate CKD (GFR = 30-44 mL/min/1 73 square meters)    Stage 4 Severe CKD (GFR = 15-29 mL/min/1 73 square meters)    Stage 5 End Stage CKD (GFR <15 mL/min/1 73 square meters)  Note: GFR calculation is accurate only with a steady state creatinine    Lipase [[id number]]  (Normal) Collected:  08/17/19 0958    Lab Status:  Final result Specimen:  Blood from Arm, Left Updated:  08/17/19 1019     Lipase 175 u/L     CBC and differential [[id number]]  (Abnormal) Collected:  08/17/19 0958    Lab Status:  Final result Specimen:  Blood from Arm, Left Updated:  08/17/19 1003     WBC 15 53 Thousand/uL      RBC 5 05 Million/uL      Hemoglobin 15 8 g/dL      Hematocrit 45 7 %      MCV 91 fL      MCH 31 3 pg      MCHC 34 6 g/dL      RDW 12 2 %      MPV 10 2 fL      Platelets 104 Thousands/uL      nRBC 0 /100 WBCs      Neutrophils Relative 85 %      Immat GRANS % 0 %      Lymphocytes Relative 8 %      Monocytes Relative 7 %      Eosinophils Relative 0 %      Basophils Relative 0 %      Neutrophils Absolute 13 11 Thousands/µL      Immature Grans Absolute 0 05 Thousand/uL      Lymphocytes Absolute 1 20 Thousands/µL      Monocytes Absolute 1 10 Thousand/µL      Eosinophils Absolute 0 03 Thousand/µL      Basophils Absolute 0 04 Thousands/µL                  CT renal stone study abdomen pelvis wo contrast   Final Result by Amalia Smith MD (08/17 1152)      4 mm obstructing calculus at the right ureterovesical junction resulting in mild right hydroureteronephrosis  Small hiatal hernia  The study was marked in [facility] for immediate notification                 Workstation performed: [id number]                    Procedures  Procedures       ED Course  ED Course as of Aug 25 1859   Sat Aug 17, 2019   1201 Stone noted on scan, on re-evaluation, patient states that he had temporary relief from the Toradol but is now more pain  Morphine ordered  [phone] Patient feeling better, is comfortable with the idea of discharge with pain medications and Urology follow-up  MDM  Number of Diagnoses or Management Options  Kidney stone:   Diagnosis management comments: 13-year-old male with kidney stone, will check kidney function, give pain medicines, CT renal stone study  Disposition  Final diagnoses:   Kidney stone     Time reflects when diagnosis was documented in both MDM as applicable and the Disposition within this note     Time User Action Codes Description Comment    8/17/2019 12:46 PM Marizolmitesh Allen Add [N20 0] Kidney stone       ED Disposition     ED Disposition Condition Date/Time Comment    Discharge Stable Sat Aug 17, 2019 12:46 PM Marisela Bustos discharge to home/self care              Follow-up Information     Follow up With Specialties Details Why Contact Info Additional [address] [facility]   [address]  [facility] [id number] [phone] [employer] [facility], [address],  [address] [place], [place], [address]          Discharge Medication List as of 8/17/2019 12:52 PM      START taking these medications    Details   morphine (MSIR) 15 mg tablet Take 0 5 tablets (7 5 mg total) by mouth every 4 (four) hours as needed for severe pain for up to 3 daysMax Daily Amount: 45 mg, Starting Sat 8/17/2019, Until Tue 8/20/2019, Print         CONTINUE these medications which have NOT CHANGED    Details   ALPRAZolam (XANAX) 0 5 mg tablet Take 0 5 mg by mouth Three times daily as needed, Starting Fri 12/28/2018, Historical Med      pantoprazole (PROTONIX) 40 mg tablet Take 40 mg by mouth daily, Starting Tue 8/6/2019, Until Wed 8/5/2020, Historical Med      Multiple Vitamin (MULTI-VITAMIN DAILY PO) Take 1 tablet by mouth daily, Historical Med           No discharge procedures on file      ED Provider  Electronically Signed by           Erica Martin MD  08/25/19 2193

## 2019-08-27 ENCOUNTER — HOSPITAL ENCOUNTER (OUTPATIENT)
Dept: ULTRASOUND IMAGING | Facility: HOSPITAL | Age: 44
Discharge: HOME | End: 2019-08-27
Payer: COMMERCIAL

## 2019-08-27 DIAGNOSIS — N20.0 NEPHROLITHIASIS: ICD-10-CM

## 2019-08-27 PROCEDURE — 76770 US EXAM ABDO BACK WALL COMP: CPT

## 2019-08-29 ENCOUNTER — OFFICE VISIT (OUTPATIENT)
Dept: UROLOGY | Facility: CLINIC | Age: 44
End: 2019-08-29
Payer: COMMERCIAL

## 2019-08-29 ENCOUNTER — TELEPHONE (OUTPATIENT)
Dept: UROLOGY | Facility: MEDICAL CENTER | Age: 44
End: 2019-08-29

## 2019-08-29 VITALS
SYSTOLIC BLOOD PRESSURE: 116 MMHG | HEART RATE: 68 BPM | DIASTOLIC BLOOD PRESSURE: 62 MMHG | HEIGHT: 67 IN | BODY MASS INDEX: 25.9 KG/M2 | WEIGHT: 165 LBS

## 2019-08-29 DIAGNOSIS — N20.0 NEPHROLITHIASIS: Primary | ICD-10-CM

## 2019-08-29 LAB
SL AMB  POCT GLUCOSE, UA: NORMAL
SL AMB LEUKOCYTE ESTERASE,UA: NORMAL
SL AMB POCT BILIRUBIN,UA: NORMAL
SL AMB POCT BLOOD,UA: NORMAL
SL AMB POCT CLARITY,UA: NORMAL
SL AMB POCT COLOR,UA: NORMAL
SL AMB POCT KETONES,UA: NORMAL
SL AMB POCT NITRITE,UA: NORMAL
SL AMB POCT PH,UA: 6
SL AMB POCT SPECIFIC GRAVITY,UA: 1.03
SL AMB POCT URINE PROTEIN: NORMAL
SL AMB POCT UROBILINOGEN: 0.2

## 2019-08-29 PROCEDURE — 81002 URINALYSIS NONAUTO W/O SCOPE: CPT | Performed by: PHYSICIAN ASSISTANT

## 2019-08-29 PROCEDURE — 99214 OFFICE O/P EST MOD 30 MIN: CPT | Performed by: PHYSICIAN ASSISTANT

## 2019-08-29 NOTE — PROGRESS NOTES
1  Nephrolithiasis  POCT urine dip    Case request operating room: CYSTOSCOPY URETEROSCOPY WITH LITHOTRIPSY HOLMIUM LASER, RETROGRADE PYELOGRAM AND INSERTION STENT URETERAL    Case request operating room: CYSTOSCOPY URETEROSCOPY WITH LITHOTRIPSY HOLMIUM LASER, RETROGRADE PYELOGRAM AND INSERTION STENT URETERAL         Assessment and plan:       1  4 mm obstructing right UVJ calculus     Patient's stone remains right distal UVJ medical therapy 2-3 weeks  We discussed surgical removal of the stone including cystoscopy, right ureteroscopy, holmium laser, basket extraction, retrograde pyelogram, ureteral stent insertion  Reviewed the risks of the procedure including but not limited to cardiopulmonary complications, bleeding, infection, VTE, damage to nearby structures, ureteral stricture, need for additional procedures  Patient verbalized understanding  This will be reviewed preoperatively  Patient is aware NPO  Patient will contact us in the interim should he visualized passage of stone or have resolution of symptoms  all questions answered  Tana Barnett PA-C      Chief Complaint     F/u kidney stone    History of Present Illness     Toro Shearer is a 53 y o  male presenting for follow-up with a 4 mm kidney stone  Patient was in the emergency department 819 19 in regards to right flank pain  He had previously been diagnosed with a kidney stone at another [facility]  Patient had a repeat CT stone study (08/17/2019) which confirmed a 4 mm obstructing right UVJ calculus with mild right hydroureteronephrosis  Patient has been doing well over the past week  Has intermittent right lower quadrant discomfort that radiates into his right testicle  Denies any dysuria, gross hematuria, flank pain, nausea, vomiting, fevers, or chills  Been hydrating  Stone  Patient had a recent ultrasound of kidney bladder (08/27/2019) revealing persistent 4 mm distal right UVJ    No hydronephrosis was present and bilateral ureteral jets were visualized  Laboratory     Lab Results   Component Value Date    CREATININE 1 17 08/19/2019       Review of Systems     Review of Systems   Constitutional: Negative for activity change, appetite change, chills, diaphoresis, fatigue, fever and unexpected weight change  Respiratory: Negative for chest tightness and shortness of breath  Cardiovascular: Negative for chest pain, palpitations and leg swelling  Gastrointestinal: Negative for abdominal distention, abdominal pain, constipation, diarrhea, nausea and vomiting  Genitourinary: Negative for decreased urine volume, difficulty urinating, dysuria, enuresis, flank pain, frequency, genital sores, hematuria and urgency  Musculoskeletal: Negative for back pain, gait problem and myalgias  Skin: Negative for color change, pallor, rash and wound  Psychiatric/Behavioral: Negative for behavioral problems  The patient is not nervous/anxious  Allergies     No Known Allergies    Physical Exam     Physical Exam   Constitutional: He is oriented to person, place, and time  He appears well-developed and well-nourished  No distress  HENT:   Head: Normocephalic and atraumatic  Eyes: Conjunctivae are normal    Neck: Normal range of motion  No tracheal deviation present  Cardiovascular: Normal rate and regular rhythm  Exam reveals no gallop and no friction rub  No murmur heard  Pulmonary/Chest: Effort normal and breath sounds normal  No stridor  No respiratory distress  He has no wheezes  He has no rales  Musculoskeletal: Normal range of motion  He exhibits no edema or deformity  Neurological: He is alert and oriented to person, place, and time  Skin: Skin is warm and dry  No rash noted  He is not diaphoretic  No erythema  Psychiatric: He has a normal mood and affect   His behavior is normal          Vital Signs     Vitals:    08/29/19 0756   BP: 116/62   BP Location: Left arm   Patient Position: Sitting   Cuff Size: Standard   Pulse: 68   Weight: 74 8 kg (165 lb)   Height: 5' 7" (1 702 m)         Current Medications       Current Outpatient Medications:     ALPRAZolam (XANAX) 0 5 mg tablet, Take 0 5 mg by mouth Three times daily as needed, Disp: , Rfl:     Multiple Vitamin (MULTI-VITAMIN DAILY PO), Take 1 tablet by mouth daily, Disp: , Rfl:     pantoprazole (PROTONIX) 40 mg tablet, Take 40 mg by mouth daily, Disp: , Rfl:     tamsulosin (FLOMAX) 0 4 mg, Take 1 capsule (0 4 mg total) by mouth daily with dinner, Disp: 20 capsule, Rfl: 1    naproxen (NAPROSYN) 500 mg tablet, Take 1 tablet (500 mg total) by mouth 2 (two) times a day with meals for 5 days (Patient not taking: Reported on 8/29/2019), Disp: 10 tablet, Rfl: 0    ondansetron (ZOFRAN-ODT) 4 mg disintegrating tablet, Take 1 tablet (4 mg total) by mouth every 8 (eight) hours as needed for nausea or vomiting for up to 3 days (Patient not taking: Reported on 8/29/2019), Disp: 9 tablet, Rfl: 0    oxyCODONE-acetaminophen (PERCOCET) 5-325 mg per tablet, Take 1 tablet by mouth every 6 (six) hours as needed for severe pain for up to 20 dosesMax Daily Amount: 4 tablets (Patient not taking: Reported on 8/29/2019), Disp: 20 tablet, Rfl: 0      Active Problems     Patient Active Problem List   Diagnosis    Mild traumatic brain injury ([facility] [address] )    Daily headache    Anxiety disorder    Degeneration of intervertebral disc    Foot pain, bilateral    Gastroesophageal reflux disease    Lumbosacral spondylosis    Pure hypercholesterolemia         Past Medical History     Past Medical History:   Diagnosis Date    Anxiety     Hyperlipidemia          Surgical History     Past Surgical History:   Procedure Laterality Date    NO PAST SURGERIES           Family History     Family History   Problem Relation Age of Onset    No Known Problems Mother     Hyperlipidemia Father     Alzheimer's disease Maternal Grandfather     Stomach cancer Paternal Grandfather          Social History     Social History       Radiology

## 2019-08-29 NOTE — TELEPHONE ENCOUNTER
I spoke to the patient and scheduled his right ureteroscopy with laser, stent with Dr Chapin at [address] [place] [address] for 9/24/19

## 2019-08-30 NOTE — TELEPHONE ENCOUNTER
- Instructions given verbally, mailed and Emailed  -Patient aware Urine C&S is required  -Patient aware to stop any blood thinning meds 7 days prior  -No Auth or clearances required

## 2019-09-05 ENCOUNTER — APPOINTMENT (OUTPATIENT)
Dept: LAB | Facility: HOSPITAL | Age: 44
End: 2019-09-05
Payer: COMMERCIAL

## 2019-09-05 PROCEDURE — 82360 CALCULUS ASSAY QUANT: CPT

## 2019-09-05 NOTE — TELEPHONE ENCOUNTER
Called and spoke to patient  Patient is feeling well since he passed stone  Made patient aware that he should take his stone to the lab to be sent out for a stone analysis  Patient will take stone to a [facility]  Order for stone analysis was placed  Patient is aware that we are going to cancel surgery since he has passed stone  Patient is aware that he should follow up in 6 months with a KUB and ultrasound prior to visit  KUB and US ordered  Follow up in 6 months was scheduled  Patient is aware to call central scheduling to schedule US and is aware to have this done about 1 week prior to appointment with us  Patient verbalized understanding and denies any other questions or concerns

## 2019-09-05 NOTE — TELEPHONE ENCOUNTER
I am happy to hear that patient has passed his stone  We can cancel his surgery at this time  We will send his stone out for analysis  Would recommend following up in 6 months with a KUB and ultrasound prior to visit  Thank you

## 2019-09-05 NOTE — TELEPHONE ENCOUNTER
Patient is managed by Ben Arshad PA-C at the [facility] office  Patient was seen for nephrolithiasis  Patient is scheduled for surgery with Dr Owens on 9/24/19  Patient is reporting that he passed stone last night and has stone  Should patient send stone out for analysis? Should surgery be canceled and patient repeat imaging? Please advise

## 2019-09-12 LAB
CA PHOS MFR STONE: 5 %
CALCIUM OXALATE DIHYDRATE MFR STONE IR: 30 %
COLOR STONE: NORMAL
COM MFR STONE: 65 %
COMMENT-STONE3: NORMAL
COMPOSITION: NORMAL
LABORATORY COMMENT REPORT: NORMAL
NIDUS STONE QL: NORMAL
PHOTO: NORMAL
SIZE STONE: NORMAL MM
STONE ANALYSIS-IMP: NORMAL
WT STONE: 13.8 MG

## 2020-01-08 ENCOUNTER — TELEPHONE (OUTPATIENT)
Dept: UROLOGY | Facility: MEDICAL CENTER | Age: 45
End: 2020-01-08

## 2020-02-03 ENCOUNTER — HOSPITAL ENCOUNTER (OUTPATIENT)
Dept: ULTRASOUND IMAGING | Facility: HOSPITAL | Age: 45
Discharge: HOME | End: 2020-02-03
Payer: COMMERCIAL

## 2020-02-03 ENCOUNTER — TRANSCRIBE ORDERS (OUTPATIENT)
Dept: ADMINISTRATIVE | Facility: HOSPITAL | Age: 45
End: 2020-02-03

## 2020-02-03 ENCOUNTER — HOSPITAL ENCOUNTER (OUTPATIENT)
Dept: RADIOLOGY | Facility: HOSPITAL | Age: 45
Discharge: HOME | End: 2020-02-03
Payer: COMMERCIAL

## 2020-02-03 DIAGNOSIS — M54.50 LOW BACK PAIN, UNSPECIFIED BACK PAIN LATERALITY, UNSPECIFIED CHRONICITY, UNSPECIFIED WHETHER SCIATICA PRESENT: ICD-10-CM

## 2020-02-03 DIAGNOSIS — N20.0 NEPHROLITHIASIS: ICD-10-CM

## 2020-02-03 DIAGNOSIS — M54.16 LUMBAR RADICULOPATHY: ICD-10-CM

## 2020-02-03 DIAGNOSIS — M54.50 LOW BACK PAIN, UNSPECIFIED BACK PAIN LATERALITY, UNSPECIFIED CHRONICITY, UNSPECIFIED WHETHER SCIATICA PRESENT: Primary | ICD-10-CM

## 2020-02-03 PROCEDURE — 72110 X-RAY EXAM L-2 SPINE 4/>VWS: CPT

## 2020-02-03 PROCEDURE — 74018 RADEX ABDOMEN 1 VIEW: CPT

## 2020-02-03 PROCEDURE — 51798 US URINE CAPACITY MEASURE: CPT

## 2020-02-06 ENCOUNTER — APPOINTMENT (OUTPATIENT)
Dept: LAB | Facility: HOSPITAL | Age: 45
End: 2020-02-06
Payer: COMMERCIAL

## 2020-02-06 ENCOUNTER — TELEPHONE (OUTPATIENT)
Dept: UROLOGY | Facility: CLINIC | Age: 45
End: 2020-02-06

## 2020-02-06 DIAGNOSIS — Z12.5 SCREENING FOR PROSTATE CANCER: Primary | ICD-10-CM

## 2020-02-06 DIAGNOSIS — Z12.5 SCREENING FOR PROSTATE CANCER: ICD-10-CM

## 2020-02-06 LAB — PSA SERPL-MCNC: 0.6 NG/ML (ref 0–4)

## 2020-02-06 PROCEDURE — 36415 COLL VENOUS BLD VENIPUNCTURE: CPT

## 2020-02-06 PROCEDURE — G0103 PSA SCREENING: HCPCS

## 2020-02-06 NOTE — TELEPHONE ENCOUNTER
Called and left message for patient  In message stated that he should obtain a PSA for routine prostate cancer screening prior to his follow-up appointment due to prostatomegaly noted on his recent ultrasound  In message stated that there was an order placed and he can get this done at a [facility] lab  Patient is to call the office with any questions or concerns  Office number was left in the message

## 2020-02-06 NOTE — TELEPHONE ENCOUNTER
----- Message from Wilmar Hale PA-C sent at 2/6/2020  6:49 AM EST -----  Let's please have patient obtain a PSA for routine prostate cancer screening prior to his follow-up appointment due to prostatomegaly noted on his recent ultrasound  Orders entered in epic  Please contact the patient to make aware  Thank you

## 2020-02-06 NOTE — TELEPHONE ENCOUNTER
Patient called back asking if he had an order for blood work, and I told him yes a PSA was put in this morning, he can go to any [facility] lab and have it down without a script

## 2020-02-26 ENCOUNTER — OFFICE VISIT (OUTPATIENT)
Dept: UROLOGY | Facility: CLINIC | Age: 45
End: 2020-02-26
Payer: COMMERCIAL

## 2020-02-26 VITALS
DIASTOLIC BLOOD PRESSURE: 62 MMHG | WEIGHT: 163 LBS | SYSTOLIC BLOOD PRESSURE: 110 MMHG | HEART RATE: 62 BPM | HEIGHT: 67 IN | BODY MASS INDEX: 25.58 KG/M2

## 2020-02-26 DIAGNOSIS — Z12.5 SCREENING FOR PROSTATE CANCER: ICD-10-CM

## 2020-02-26 DIAGNOSIS — N20.0 NEPHROLITHIASIS: Primary | ICD-10-CM

## 2020-02-26 LAB
BACTERIA UR QL AUTO: ABNORMAL /HPF
BILIRUB UR QL STRIP: NEGATIVE
CAOX CRY URNS QL MICRO: ABNORMAL /HPF
CLARITY UR: CLEAR
COLOR UR: YELLOW
GLUCOSE UR STRIP-MCNC: NEGATIVE MG/DL
HGB UR QL STRIP.AUTO: NEGATIVE
KETONES UR STRIP-MCNC: NEGATIVE MG/DL
LEUKOCYTE ESTERASE UR QL STRIP: NEGATIVE
NITRITE UR QL STRIP: NEGATIVE
NON-SQ EPI CELLS URNS QL MICRO: ABNORMAL /HPF
PH UR STRIP.AUTO: 5 [PH]
PROT UR STRIP-MCNC: NEGATIVE MG/DL
RBC #/AREA URNS AUTO: ABNORMAL /HPF
SL AMB  POCT GLUCOSE, UA: ABNORMAL
SL AMB LEUKOCYTE ESTERASE,UA: ABNORMAL
SL AMB POCT BILIRUBIN,UA: ABNORMAL
SL AMB POCT BLOOD,UA: 2
SL AMB POCT CLARITY,UA: CLEAR
SL AMB POCT COLOR,UA: ABNORMAL
SL AMB POCT KETONES,UA: ABNORMAL
SL AMB POCT NITRITE,UA: ABNORMAL
SL AMB POCT PH,UA: 5
SL AMB POCT SPECIFIC GRAVITY,UA: ABNORMAL
SL AMB POCT URINE PROTEIN: ABNORMAL
SL AMB POCT UROBILINOGEN: ABNORMAL
SP GR UR STRIP.AUTO: 1.03 (ref 1–1.03)
UROBILINOGEN UR QL STRIP.AUTO: 0.2 E.U./DL
WBC #/AREA URNS AUTO: ABNORMAL /HPF

## 2020-02-26 PROCEDURE — 99213 OFFICE O/P EST LOW 20 MIN: CPT | Performed by: PHYSICIAN ASSISTANT

## 2020-02-26 PROCEDURE — 81002 URINALYSIS NONAUTO W/O SCOPE: CPT | Performed by: PHYSICIAN ASSISTANT

## 2020-02-26 PROCEDURE — 81001 URINALYSIS AUTO W/SCOPE: CPT | Performed by: PHYSICIAN ASSISTANT

## 2020-02-26 NOTE — PROGRESS NOTES
1  Nephrolithiasis  POCT urine dip    XR abdomen 1 view kub    Urinalysis with microscopic   2  Screening for prostate cancer  PSA, Total Screen         Assessment and plan:       1  Nephrolithiasis  - I was reviewed with the patient that his stone was calcium oxalate in origin  We discussed proper hydration dietary modifications to minimize future stone formation  - no current evidence of residual obstruction or intrarenal calculi  - will follow up in 1 year with a KUB prior to visit for continued surveillance  If this is negative then routine screening can be discontinued  - encouraged to contact us in the interim with any signs and symptoms of a passing stone  2  Routine prostate cancer screening  - PSA is 0 6 with normal prostate examination in the office today  - follow-up 1 year with PSA prior to visit    3  Routine urine testing  - I did review with the patient that his urine specimen in the office today indicates potential blood  Will be submitted for formal urinalysis with microscopy  If positive will need cystoscopy for hematuria workup  Naila Adair PA-C      Chief Complaint     F/u kidney stone    History of Present Illness     Vane Coppola is a 50 y o  male presenting for follow-up of nephrolithiasis  Patient previously had a 4 mm right ureteral stone in 2019  Patient was coordinated for ureteroscopy however fortunately passed a stone on his own  Ford Basket was sent out for analysis which revealed calcium oxalate in origin  Patient presents today with a KUB and ultrasound (02/03/2012 the)  KUB is negative for any urinary tract calculi  Ultrasound is negative for any upper tract obstruction and bilateral ureteral jets were detected  Patient was found to have enlarged prostate protruding into the bladder with diffuse mild bladder wall thickening versus just under distention of the bladder    No significant postvoid residual     Patient is currently overall comfortable with his lower urinary tract symptoms  Denies any dysuria, gross hematuria, suprapubic pressure, urinary infections, flank pain, nausea, vomiting, fevers, or chills  Does endorse some urinary hesitancy and post micturition dribbling however this is very mild and non bothersome to him  He is not on any medications for his prostate or bladder  Denies any family history of prostate cancer  Recent PSA is 0 6 (02/06/2020)  Urine dip in the office today is leukocyte and nitrite negative, trace blood  Laboratory     Lab Results   Component Value Date    CREATININE 1 17 08/19/2019       Review of Systems     Review of Systems   Constitutional: Negative for activity change, appetite change, chills, diaphoresis, fatigue, fever and unexpected weight change  Respiratory: Negative for chest tightness and shortness of breath  Cardiovascular: Negative for chest pain, palpitations and leg swelling  Gastrointestinal: Negative for abdominal distention, abdominal pain, constipation, diarrhea, nausea and vomiting  Genitourinary: Negative for decreased urine volume, difficulty urinating, dysuria, enuresis, flank pain, frequency, genital sores, hematuria and urgency  Urinary hesitancy and post micturition dribbling   Musculoskeletal: Negative for back pain, gait problem and myalgias  Skin: Negative for color change, pallor, rash and wound  Psychiatric/Behavioral: Negative for behavioral problems  The patient is not nervous/anxious  Allergies     No Known Allergies    Physical Exam     Physical Exam   Constitutional: He is oriented to person, place, and time  He appears well-developed and well-nourished  No distress  HENT:   Head: Normocephalic and atraumatic  Eyes: Conjunctivae are normal    Neck: Normal range of motion  No tracheal deviation present  Cardiovascular: Normal rate and regular rhythm  Exam reveals no gallop and no friction rub  No murmur heard    Pulmonary/Chest: Effort normal and breath sounds normal  No stridor  No respiratory distress  He has no wheezes  He has no rales  Genitourinary:   Genitourinary Comments: Good rectal tone  Prostate 25g, smooth, symmetric, no palpable nodules   Musculoskeletal: Normal range of motion  He exhibits no edema or deformity  Neurological: He is alert and oriented to person, place, and time  Skin: Skin is warm and dry  No rash noted  He is not diaphoretic  No erythema  Psychiatric: He has a normal mood and affect   His behavior is normal          Vital Signs     Vitals:    02/26/20 0804   BP: 110/62   BP Location: Left arm   Patient Position: Sitting   Cuff Size: Extra-Large   Pulse: 62   Weight: 73 9 kg (163 lb)   Height: 5' 7" (1 702 m)         Current Medications       Current Outpatient Medications:     ALPRAZolam (XANAX) 0 5 mg tablet, Take 0 5 mg by mouth Three times daily as needed, Disp: , Rfl:     Multiple Vitamin (MULTI-VITAMIN DAILY PO), Take 1 tablet by mouth daily, Disp: , Rfl:     naproxen (NAPROSYN) 500 mg tablet, Take 1 tablet (500 mg total) by mouth 2 (two) times a day with meals for 5 days (Patient not taking: Reported on 8/29/2019), Disp: 10 tablet, Rfl: 0    ondansetron (ZOFRAN-ODT) 4 mg disintegrating tablet, Take 1 tablet (4 mg total) by mouth every 8 (eight) hours as needed for nausea or vomiting for up to 3 days (Patient not taking: Reported on 8/29/2019), Disp: 9 tablet, Rfl: 0    oxyCODONE-acetaminophen (PERCOCET) 5-325 mg per tablet, Take 1 tablet by mouth every 6 (six) hours as needed for severe pain for up to 20 dosesMax Daily Amount: 4 tablets (Patient not taking: Reported on 2/26/2020), Disp: 20 tablet, Rfl: 0    pantoprazole (PROTONIX) 40 mg tablet, Take 40 mg by mouth daily, Disp: , Rfl:     tamsulosin (FLOMAX) 0 4 mg, Take 1 capsule (0 4 mg total) by mouth daily with dinner (Patient not taking: Reported on 2/26/2020), Disp: 20 capsule, Rfl: 1      Active Problems     Patient Active Problem List   Diagnosis    Mild traumatic brain injury ([facility] [address] )    Daily headache    Anxiety disorder    Degeneration of intervertebral disc    Foot pain, bilateral    Gastroesophageal reflux disease    Lumbosacral spondylosis    Pure hypercholesterolemia    Nephrolithiasis         Past Medical History     Past Medical History:   Diagnosis Date    Anxiety     Hyperlipidemia          Surgical History     Past Surgical History:   Procedure Laterality Date    NO PAST SURGERIES           Family History     Family History   Problem Relation Age of Onset    No Known Problems Mother     Hyperlipidemia Father     Alzheimer's disease Maternal Grandfather     Stomach cancer Paternal Grandfather          Social History     Social History       Radiology     RENAL ULTRASOUND     INDICATION:   N20 0: Calculus of kidney      COMPARISON: August 27, 2019     TECHNIQUE:   Ultrasound of the retroperitoneum complete with postvoid bladder volume residual was performed with a curvilinear transducer utilizing volumetric sweeps and still imaging techniques       FINDINGS:     KIDNEYS:  Symmetric and normal size  Right kidney:  11 6 cm  Left kidney:  11 4 cm      Right kidney  Normal echogenicity and contour  No suspicious masses detected  No hydronephrosis  No shadowing calculi  No perinephric fluid collections      Left kidney  Normal echogenicity and contour  No suspicious masses detected  No hydronephrosis  No shadowing calculi  No perinephric fluid collections      URETERS:  Nonvisualized      BLADDER:   Diffuse mild bladder wall thickening could relate to underdistention or chronic outlet obstruction, correlate with urinalysis  Heterogeneous prostate with superior nodular component that appears to protrude into the bladder base  Bilateral ureteral jets detected    No significant postvoid residual urine volume      IMPRESSION:     Heterogeneous prostate with superior nodular component that appears to protrude into the bladder base      Diffuse mild bladder wall thickening could relate to underdistention or chronic outlet obstruction, correlate with urinalysis      No significant postvoid bladder volume residual

## 2020-02-27 ENCOUNTER — TELEPHONE (OUTPATIENT)
Dept: UROLOGY | Facility: CLINIC | Age: 45
End: 2020-02-27

## 2020-02-27 NOTE — TELEPHONE ENCOUNTER
----- Message from Keren Murray PA-C sent at 2/27/2020  7:00 AM EST -----  Please let patient know urine is negative for microscopic hematuria - no further testing needed

## 2020-02-27 NOTE — TELEPHONE ENCOUNTER
Called and spoke to patient has him aware that his urine is negative for microscopic hematuria and that no further testing is needed  Patient verbalized understanding and denies any other questions or concerns

## 2021-01-12 ENCOUNTER — TELEPHONE (OUTPATIENT)
Dept: UROLOGY | Facility: MEDICAL CENTER | Age: 46
End: 2021-01-12

## 2021-01-12 DIAGNOSIS — N20.0 NEPHROLITHIASIS: Primary | ICD-10-CM

## 2021-01-12 NOTE — TELEPHONE ENCOUNTER
Patient managed by Ulises Alicea in [facility] office    Patient would like to know if doctor can order labs to check his cholesterol  and basic test

## 2021-01-12 NOTE — TELEPHONE ENCOUNTER
Called and spoke to patient  Made him aware that we can order a BMP to evaluate for renal functioning  Made him aware that he will need to contact his primary care provider for cholesterol screening  He verbalized understanding  BMP ordered

## 2021-01-12 NOTE — TELEPHONE ENCOUNTER
Patient is managed by Jaqueline Vasquez PA-C at the [facility] office  Patient was last seen for nephrolithiasis and prostate cancer screening on 2/26/20  Plan at last visit was for patient to follow up in 1 year with KUB and PSA prior to visit  Follow up schedule for 2/24  Patient called in and is requesting an order to check his cholesterol and a BMP  Please review and advise if this is appropriate or if he should request this from his PCP  Thank you

## 2021-01-12 NOTE — TELEPHONE ENCOUNTER
We can order a BMP to evaluate for renal functioning, however patient will need to contact his primary care provider for cholesterol screening

## 2021-02-04 ENCOUNTER — APPOINTMENT (OUTPATIENT)
Dept: LAB | Facility: HOSPITAL | Age: 46
End: 2021-02-04
Payer: COMMERCIAL

## 2021-02-04 ENCOUNTER — HOSPITAL ENCOUNTER (OUTPATIENT)
Dept: RADIOLOGY | Facility: HOSPITAL | Age: 46
Discharge: HOME | End: 2021-02-04
Payer: COMMERCIAL

## 2021-02-04 ENCOUNTER — TRANSCRIBE ORDERS (OUTPATIENT)
Dept: ADMINISTRATIVE | Facility: HOSPITAL | Age: 46
End: 2021-02-04

## 2021-02-04 DIAGNOSIS — Z12.5 SCREENING FOR PROSTATE CANCER: ICD-10-CM

## 2021-02-04 DIAGNOSIS — N20.0 NEPHROLITHIASIS: ICD-10-CM

## 2021-02-04 DIAGNOSIS — E78.00 PURE HYPERCHOLESTEROLEMIA: ICD-10-CM

## 2021-02-04 DIAGNOSIS — E78.00 PURE HYPERCHOLESTEROLEMIA: Primary | ICD-10-CM

## 2021-02-04 LAB
ALBUMIN SERPL BCP-MCNC: 3.6 G/DL (ref 3.5–5)
ALP SERPL-CCNC: 85 U/L (ref 46–116)
ALT SERPL W P-5'-P-CCNC: 21 U/L (ref 12–78)
ANION GAP SERPL CALCULATED.3IONS-SCNC: 4 MMOL/L (ref 4–13)
AST SERPL W P-5'-P-CCNC: 22 U/L (ref 5–45)
BASOPHILS # BLD AUTO: 0.05 THOUSANDS/ΜL (ref 0–0.1)
BASOPHILS NFR BLD AUTO: 1 % (ref 0–1)
BILIRUB SERPL-MCNC: 0.5 MG/DL (ref 0.2–1)
BUN SERPL-MCNC: 15 MG/DL (ref 5–25)
CALCIUM SERPL-MCNC: 8.7 MG/DL (ref 8.3–10.1)
CHLORIDE SERPL-SCNC: 103 MMOL/L (ref 100–108)
CHOLEST SERPL-MCNC: 224 MG/DL (ref 50–200)
CO2 SERPL-SCNC: 29 MMOL/L (ref 21–32)
CREAT SERPL-MCNC: 1.09 MG/DL (ref 0.6–1.3)
EOSINOPHIL # BLD AUTO: 0.09 THOUSAND/ΜL (ref 0–0.61)
EOSINOPHIL NFR BLD AUTO: 2 % (ref 0–6)
ERYTHROCYTE [DISTWIDTH] IN BLOOD BY AUTOMATED COUNT: 12.1 % (ref 11.6–15.1)
GFR SERPL CREATININE-BSD FRML MDRD: 80 ML/MIN/1.73SQ M
GLUCOSE P FAST SERPL-MCNC: 91 MG/DL (ref 65–99)
HCT VFR BLD AUTO: 48.8 % (ref 36.5–49.3)
HDLC SERPL-MCNC: 43 MG/DL
HGB BLD-MCNC: 16 G/DL (ref 12–17)
IMM GRANULOCYTES # BLD AUTO: 0.02 THOUSAND/UL (ref 0–0.2)
IMM GRANULOCYTES NFR BLD AUTO: 1 % (ref 0–2)
LDLC SERPL CALC-MCNC: 156 MG/DL (ref 0–100)
LYMPHOCYTES # BLD AUTO: 1.35 THOUSANDS/ΜL (ref 0.6–4.47)
LYMPHOCYTES NFR BLD AUTO: 34 % (ref 14–44)
MCH RBC QN AUTO: 30.3 PG (ref 26.8–34.3)
MCHC RBC AUTO-ENTMCNC: 32.8 G/DL (ref 31.4–37.4)
MCV RBC AUTO: 92 FL (ref 82–98)
MONOCYTES # BLD AUTO: 0.38 THOUSAND/ΜL (ref 0.17–1.22)
MONOCYTES NFR BLD AUTO: 10 % (ref 4–12)
NEUTROPHILS # BLD AUTO: 2.05 THOUSANDS/ΜL (ref 1.85–7.62)
NEUTS SEG NFR BLD AUTO: 52 % (ref 43–75)
NONHDLC SERPL-MCNC: 181 MG/DL
NRBC BLD AUTO-RTO: 0 /100 WBCS
PLATELET # BLD AUTO: 190 THOUSANDS/UL (ref 149–390)
PMV BLD AUTO: 9.4 FL (ref 8.9–12.7)
POTASSIUM SERPL-SCNC: 4.3 MMOL/L (ref 3.5–5.3)
PROT SERPL-MCNC: 7 G/DL (ref 6.4–8.2)
PSA SERPL-MCNC: 0.5 NG/ML (ref 0–4)
RBC # BLD AUTO: 5.28 MILLION/UL (ref 3.88–5.62)
SODIUM SERPL-SCNC: 136 MMOL/L (ref 136–145)
TRIGL SERPL-MCNC: 126 MG/DL
TSH SERPL DL<=0.05 MIU/L-ACNC: 1.4 UIU/ML (ref 0.36–3.74)
WBC # BLD AUTO: 3.94 THOUSAND/UL (ref 4.31–10.16)

## 2021-02-04 PROCEDURE — 85025 COMPLETE CBC W/AUTO DIFF WBC: CPT

## 2021-02-04 PROCEDURE — 36415 COLL VENOUS BLD VENIPUNCTURE: CPT

## 2021-02-04 PROCEDURE — 80053 COMPREHEN METABOLIC PANEL: CPT

## 2021-02-04 PROCEDURE — 84443 ASSAY THYROID STIM HORMONE: CPT

## 2021-02-04 PROCEDURE — 74018 RADEX ABDOMEN 1 VIEW: CPT

## 2021-02-04 PROCEDURE — 80061 LIPID PANEL: CPT

## 2021-02-04 PROCEDURE — G0103 PSA SCREENING: HCPCS

## 2021-02-24 ENCOUNTER — OFFICE VISIT (OUTPATIENT)
Dept: UROLOGY | Facility: CLINIC | Age: 46
End: 2021-02-24
Payer: COMMERCIAL

## 2021-02-24 VITALS
SYSTOLIC BLOOD PRESSURE: 102 MMHG | DIASTOLIC BLOOD PRESSURE: 76 MMHG | WEIGHT: 171 LBS | HEART RATE: 69 BPM | HEIGHT: 67 IN | BODY MASS INDEX: 26.84 KG/M2

## 2021-02-24 DIAGNOSIS — N20.0 NEPHROLITHIASIS: Primary | ICD-10-CM

## 2021-02-24 LAB
SL AMB  POCT GLUCOSE, UA: NORMAL
SL AMB LEUKOCYTE ESTERASE,UA: NORMAL
SL AMB POCT BILIRUBIN,UA: NORMAL
SL AMB POCT BLOOD,UA: NORMAL
SL AMB POCT CLARITY,UA: CLEAR
SL AMB POCT COLOR,UA: YELLOW
SL AMB POCT KETONES,UA: NORMAL
SL AMB POCT NITRITE,UA: NORMAL
SL AMB POCT PH,UA: 6
SL AMB POCT SPECIFIC GRAVITY,UA: 1.03
SL AMB POCT URINE PROTEIN: NORMAL
SL AMB POCT UROBILINOGEN: 0.2

## 2021-02-24 PROCEDURE — 99213 OFFICE O/P EST LOW 20 MIN: CPT | Performed by: PHYSICIAN ASSISTANT

## 2021-02-24 PROCEDURE — 81002 URINALYSIS NONAUTO W/O SCOPE: CPT | Performed by: PHYSICIAN ASSISTANT

## 2021-02-24 NOTE — PROGRESS NOTES
1  Nephrolithiasis  POCT urine dip         Assessment and plan:       1  Nephrolithiasis,   Calcium oxalate  -  I was happy to review with the patient that his KUB is negative for any radiopaque urinary tract calculi  We will discontinue routine surveillance at this time, however patient was encouraged to continue with proper hydration dietary modifications in order to minimize future stone formation  He was encouraged to contact us should he have any concerns of future stones in the future  2  Routine prostate cancer screening  - PSA is 0 5 with normal prostate examination in the office today  -  No family history of prostate cancer  He can follow up with his primary care provider annually and restart prostate cancer screening at approximately 52years of age  Ashley Aponte PA-C      Chief Complaint     F/u kidney stone    History of Present Illness     Paula Ojeda is a 48 y o  male presenting for follow-up of nephrolithiasis  Patient previously had a 4 mm right ureteral stone in 2019  Patient was coordinated for ureteroscopy however fortunately passed a stone on his own  Abigail Cords was sent out for analysis which revealed calcium oxalate in origin  Patient presents today with a KUB (2/4/21) negative for any radiopaque urinary tract calculi  Patient is currently overall comfortable with his lower urinary tract symptoms  Denies any dysuria, gross hematuria, suprapubic pressure, urinary infections, flank pain, nausea, vomiting, fevers, or chills  Does endorse some urinary hesitancy and post micturition dribbling however this is very mild and non bothersome to him  He is not on any medications for his prostate or bladder  Denies any family history of prostate cancer      Recent PSA is 0 5 (2/4/21)      Laboratory     Lab Results   Component Value Date    CREATININE 1 09 02/04/2021       Review of Systems     Review of Systems   Constitutional: Negative for activity change, appetite change, chills, diaphoresis, fatigue, fever and unexpected weight change  Respiratory: Negative for chest tightness and shortness of breath  Cardiovascular: Negative for chest pain, palpitations and leg swelling  Gastrointestinal: Negative for abdominal distention, abdominal pain, constipation, diarrhea, nausea and vomiting  Genitourinary: Negative for decreased urine volume, difficulty urinating, dysuria, enuresis, flank pain, frequency, genital sores, hematuria and urgency  Urinary hesitancy and post micturition dribbling   Musculoskeletal: Negative for back pain, gait problem and myalgias  Skin: Negative for color change, pallor, rash and wound  Psychiatric/Behavioral: Negative for behavioral problems  The patient is not nervous/anxious  Allergies     No Known Allergies    Physical Exam     Physical Exam  Constitutional:       General: He is not in acute distress  Appearance: He is well-developed  He is not diaphoretic  HENT:      Head: Normocephalic and atraumatic  Eyes:      Conjunctiva/sclera: Conjunctivae normal    Neck:      Musculoskeletal: Normal range of motion  Trachea: No tracheal deviation  Cardiovascular:      Rate and Rhythm: Normal rate and regular rhythm  Heart sounds: No murmur  No friction rub  No gallop  Pulmonary:      Effort: Pulmonary effort is normal  No respiratory distress  Breath sounds: Normal breath sounds  No stridor  No wheezing or rales  Genitourinary:     Comments: Good rectal tone  Prostate 25g, smooth, symmetric, no palpable nodules  Musculoskeletal: Normal range of motion  General: No deformity  Skin:     General: Skin is warm and dry  Findings: No erythema or rash  Neurological:      Mental Status: He is alert and oriented to person, place, and time     Psychiatric:         Behavior: Behavior normal            Vital Signs     Vitals:    02/24/21 0804   BP: 102/76   BP Location: Left arm   Patient Position: Sitting Cuff Size: Standard   Pulse: 69   Weight: 77 6 kg (171 lb)   Height: 5' 7" (1 702 m)         Current Medications       Current Outpatient Medications:     ALPRAZolam (XANAX) 0 5 mg tablet, Take 0 5 mg by mouth Three times daily as needed, Disp: , Rfl:     Multiple Vitamin (MULTI-VITAMIN DAILY PO), Take 1 tablet by mouth daily, Disp: , Rfl:     pantoprazole (PROTONIX) 40 mg tablet, Take 40 mg by mouth daily, Disp: , Rfl:       Active Problems     Patient Active Problem List   Diagnosis    Mild traumatic brain injury ([facility] [address] )    Daily headache    Anxiety disorder    Degeneration of intervertebral disc    Foot pain, bilateral    Gastroesophageal reflux disease    Lumbosacral spondylosis    Pure hypercholesterolemia    Nephrolithiasis         Past Medical History     Past Medical History:   Diagnosis Date    Anxiety     Hyperlipidemia          Surgical History     Past Surgical History:   Procedure Laterality Date    NO PAST SURGERIES           Family History     Family History   Problem Relation Age of Onset    No Known Problems Mother     Hyperlipidemia Father     Alzheimer's disease Maternal Grandfather     Stomach cancer Paternal Grandfather          Social History     Social History       Radiology     RENAL ULTRASOUND     INDICATION:   N20 0: Calculus of kidney      COMPARISON: August 27, 2019     TECHNIQUE:   Ultrasound of the retroperitoneum complete with postvoid bladder volume residual was performed with a curvilinear transducer utilizing volumetric sweeps and still imaging techniques       FINDINGS:     KIDNEYS:  Symmetric and normal size  Right kidney:  11 6 cm  Left kidney:  11 4 cm      Right kidney  Normal echogenicity and contour  No suspicious masses detected  No hydronephrosis  No shadowing calculi  No perinephric fluid collections      Left kidney  Normal echogenicity and contour  No suspicious masses detected  No hydronephrosis  No shadowing calculi    No perinephric fluid collections      URETERS:  Nonvisualized      BLADDER:   Diffuse mild bladder wall thickening could relate to underdistention or chronic outlet obstruction, correlate with urinalysis  Heterogeneous prostate with superior nodular component that appears to protrude into the bladder base  Bilateral ureteral jets detected    No significant postvoid residual urine volume      IMPRESSION:     Heterogeneous prostate with superior nodular component that appears to protrude into the bladder base      Diffuse mild bladder wall thickening could relate to underdistention or chronic outlet obstruction, correlate with urinalysis      No significant postvoid bladder volume residual

## 2021-03-31 DIAGNOSIS — Z23 ENCOUNTER FOR IMMUNIZATION: ICD-10-CM

## 2022-01-06 NOTE — PROGRESS NOTES
1/7/2022      Chief Complaint   Patient presents with    Erectile Dysfunction     Assessment and Plan    1  ED, resolved  - Patient reports new onset transient episode of erectile dysfunction  States this has since resolved  - He had recent normal blood work including testosterone level which was within normal limits  He had slightly elevated lipid panel  He also completed a testicular ultrasound which was negative for any abnormalities  Physical exam is within normal limits today  He states he is seeking further evaluation with endocrinologist for other medical complaints  - Discussed a trial of PDE5 inhibitor as needed for any recurrent erectile dysfunction issues  He defers at this time   - Recommend regular exercise, stress management, maintaining healthy weight, healthy diet, and adequate sleep to improve erectile function   - He can follow up as needed for any continued urologic issues  History of Present Illness  Justyna Vega is a 48 y o  male here for follow up evaluation of  new problem of erectile dysfunction  Reports this began suddenly and was transient causing difficulties with both maintaining and obtaining erections  He states this issue has now resolved and he is able to achieve adequate erections  He denies any treatment with PDE5 inhibitors  He denies any recent pelvic trauma or injury  Did report some transient numbness and tingling to his penis which has since resolved  He does have history of lumbar spine issues  Denies any pain with erections or penile curvature  Denies any penile masses  He had testosterone level which was within normal range at 602  Recent testicular ultrasound was normal      Review of Systems   Constitutional: Negative for chills and fever  Respiratory: Negative for shortness of breath  Cardiovascular: Negative for chest pain  Gastrointestinal: Negative for abdominal pain     Genitourinary: Negative for difficulty urinating, dysuria, flank pain, frequency, hematuria, penile discharge, penile pain, penile swelling, testicular pain and urgency  Neurological: Negative for dizziness                    Past Medical History  Past Medical History:   Diagnosis Date    Anxiety     Hyperlipidemia        Past Social History  Past Surgical History:   Procedure Laterality Date    NO PAST SURGERIES       Social History     Tobacco Use   Smoking Status Never Smoker   Smokeless Tobacco Current User       Past Family History  Family History   Problem Relation Age of Onset    No Known Problems Mother     Hyperlipidemia Father     Alzheimer's disease Maternal Grandfather     Stomach cancer Paternal Grandfather        Past Social history  Social History     Socioeconomic History    Marital status: /Civil Union     Spouse name: Not on file    Number of children: Not on file    Years of education: Not on file    Highest education level: Not on file   Occupational History    Not on file   Tobacco Use    Smoking status: Never Smoker    Smokeless tobacco: Current User   Vaping Use    Vaping Use: Never used   Substance and Sexual Activity    Alcohol use: Yes     Comment: social use    Drug use: No    Sexual activity: Not on file   Other Topics Concern    Not on file   Social History Narrative    Not on file     Social Determinants of Health     Financial Resource Strain: Not on file   Food Insecurity: Not on file   Transportation Needs: Not on file   Physical Activity: Not on file   Stress: Not on file   Social Connections: Not on file   Intimate Partner Violence: Not on file   Housing Stability: Not on file       Current Medications  Current Outpatient Medications   Medication Sig Dispense Refill    ALPRAZolam (XANAX) 0 5 mg tablet Take 0 5 mg by mouth Three times daily as needed      Multiple Vitamin (MULTI-VITAMIN DAILY PO) Take 1 tablet by mouth daily      pantoprazole (PROTONIX) 40 mg tablet Take 40 mg by mouth daily       No current facility-administered medications for this visit  Allergies  No Known Allergies      The following portions of the patient's history were reviewed and updated as appropriate: allergies, current medications, past medical history, past social history, past surgical history and problem list       Vitals  Vitals:    01/07/22 0833   BP: 100/80   Weight: 80 7 kg (178 lb)   Height: 5' 7" (1 702 m)           Physical Exam  Physical Exam  Constitutional:       Appearance: Normal appearance  HENT:      Head: Normocephalic and atraumatic  Right Ear: External ear normal       Left Ear: External ear normal    Eyes:      General: No scleral icterus  Conjunctiva/sclera: Conjunctivae normal    Cardiovascular:      Pulses: Normal pulses  Pulmonary:      Effort: Pulmonary effort is normal    Genitourinary:     Penis: Normal        Testes: Normal    Musculoskeletal:         General: Normal range of motion  Cervical back: Normal range of motion  Skin:     General: Skin is warm and dry  Neurological:      General: No focal deficit present  Mental Status: He is alert and oriented to person, place, and time  Psychiatric:         Mood and Affect: Mood normal          Behavior: Behavior normal          Thought Content: Thought content normal          Judgment: Judgment normal            Results  No results found for this or any previous visit (from the past 1 hour(s)) ]  Lab Results   Component Value Date    PSA 0 5 02/04/2021    PSA 0 6 02/06/2020     Lab Results   Component Value Date    CALCIUM 8 7 02/04/2021    K 4 3 02/04/2021    CO2 29 02/04/2021     02/04/2021    BUN 15 02/04/2021    CREATININE 1 09 02/04/2021     Lab Results   Component Value Date    WBC 3 94 (L) 02/04/2021    HGB 16 0 02/04/2021    HCT 48 8 02/04/2021    MCV 92 02/04/2021     02/04/2021           Orders  No orders of the defined types were placed in this encounter        Anton Khalil PA-C

## 2022-01-07 ENCOUNTER — OFFICE VISIT (OUTPATIENT)
Dept: UROLOGY | Facility: CLINIC | Age: 47
End: 2022-01-07
Payer: COMMERCIAL

## 2022-01-07 VITALS
HEIGHT: 67 IN | DIASTOLIC BLOOD PRESSURE: 80 MMHG | WEIGHT: 178 LBS | BODY MASS INDEX: 27.94 KG/M2 | SYSTOLIC BLOOD PRESSURE: 100 MMHG

## 2022-01-07 DIAGNOSIS — N52.9 ERECTILE DYSFUNCTION, UNSPECIFIED ERECTILE DYSFUNCTION TYPE: Primary | ICD-10-CM

## 2022-01-07 PROCEDURE — 99213 OFFICE O/P EST LOW 20 MIN: CPT | Performed by: PHYSICIAN ASSISTANT

## 2023-11-02 ENCOUNTER — NURSE TRIAGE (OUTPATIENT)
Age: 48
End: 2023-11-02

## 2023-11-02 NOTE — TELEPHONE ENCOUNTER
Regarding: numbness in penis  ----- Message from [facility] Ling sent at 11/2/2023  8:20 AM EDT -----  Pt calling with numbness and tingling in his groin and penis. Pt stated he does have some pain at times.      Please call pt back at [phone]

## 2023-11-02 NOTE — TELEPHONE ENCOUNTER
Patient called report numbness/tingling  in his groin and penis x 1 week. Denies trouble urinating, swelling or recent trauma. Hx of this happening  before since he had back surgery, issue resolved on own.     Reason for Disposition   ALL other penis - scrotum symptoms  (Exception: painless rash < 24 hours duration)    Protocols used: Penis and Scrotum Symptoms-ADULT-[place]

## 2024-01-24 ENCOUNTER — OFFICE VISIT (OUTPATIENT)
Dept: URGENT CARE | Facility: CLINIC | Age: 49
End: 2024-01-24
Payer: COMMERCIAL

## 2024-01-24 VITALS
BODY MASS INDEX: 27.41 KG/M2 | TEMPERATURE: 97.2 F | OXYGEN SATURATION: 97 % | HEART RATE: 76 BPM | SYSTOLIC BLOOD PRESSURE: 109 MMHG | DIASTOLIC BLOOD PRESSURE: 86 MMHG | WEIGHT: 175 LBS

## 2024-01-24 DIAGNOSIS — J02.9 SORE THROAT: ICD-10-CM

## 2024-01-24 DIAGNOSIS — H65.111 ACUTE MUCOID OTITIS MEDIA OF RIGHT EAR: Primary | ICD-10-CM

## 2024-01-24 DIAGNOSIS — R05.1 ACUTE COUGH: ICD-10-CM

## 2024-01-24 PROBLEM — R20.2 PARESTHESIAS: Status: ACTIVE | Noted: 2022-01-05

## 2024-01-24 PROBLEM — E66.3 OVERWEIGHT: Status: ACTIVE | Noted: 2022-01-06

## 2024-01-24 PROBLEM — E78.5 HYPERLIPIDEMIA: Status: ACTIVE | Noted: 2024-01-24

## 2024-01-24 PROBLEM — Z98.890 S/P LUMBAR LAMINECTOMY: Status: ACTIVE | Noted: 2022-11-03

## 2024-01-24 PROBLEM — Z72.0 CHEWING TOBACCO USE: Status: ACTIVE | Noted: 2022-01-06

## 2024-01-24 PROBLEM — M54.50 LOW BACK PAIN: Status: ACTIVE | Noted: 2022-07-27

## 2024-01-24 LAB
S PYO AG THROAT QL: NEGATIVE
SARS-COV-2 AG UPPER RESP QL IA: NEGATIVE
VALID CONTROL: NORMAL

## 2024-01-24 PROCEDURE — 99213 OFFICE O/P EST LOW 20 MIN: CPT

## 2024-01-24 PROCEDURE — 87811 SARS-COV-2 COVID19 W/OPTIC: CPT

## 2024-01-24 PROCEDURE — 87880 STREP A ASSAY W/OPTIC: CPT

## 2024-01-24 RX ORDER — ATORVASTATIN CALCIUM 20 MG/1
20 TABLET, FILM COATED ORAL DAILY
COMMUNITY
Start: 2023-09-15 | End: 2024-03-13

## 2024-01-24 RX ORDER — AMOXICILLIN 500 MG/1
500 CAPSULE ORAL EVERY 8 HOURS SCHEDULED
Qty: 21 CAPSULE | Refills: 0 | Status: SHIPPED | OUTPATIENT
Start: 2024-01-24 | End: 2024-01-31

## 2024-01-24 RX ORDER — GABAPENTIN 100 MG/1
CAPSULE ORAL
COMMUNITY

## 2024-01-24 RX ORDER — DEXAMETHASONE 1 MG
TABLET ORAL
COMMUNITY

## 2024-01-24 RX ORDER — IPRATROPIUM BROMIDE 42 UG/1
SPRAY, METERED NASAL
COMMUNITY

## 2024-01-24 RX ORDER — BENZONATATE 100 MG/1
1 CAPSULE ORAL 3 TIMES DAILY PRN
COMMUNITY
End: 2024-01-24

## 2024-01-24 RX ORDER — BENZONATATE 100 MG/1
100 CAPSULE ORAL 3 TIMES DAILY PRN
Qty: 20 CAPSULE | Refills: 0 | Status: SHIPPED | OUTPATIENT
Start: 2024-01-24

## 2024-01-24 RX ORDER — FLUTICASONE PROPIONATE 50 MCG
1 SPRAY, SUSPENSION (ML) NASAL DAILY
Qty: 9.9 ML | Refills: 0 | Status: SHIPPED | OUTPATIENT
Start: 2024-01-24

## 2024-01-24 NOTE — PATIENT INSTRUCTIONS
Give antibiotics as directed for next 7 days, complete entire course even if feeling better. Continue over-the-counter products for symptoms: tylenol for fevers, ibuprofen for body aches, flonase (fluticasone) for nasal congestion, tessalon perles for cough, and airborne/emergen-c for vitamin supplementation. Follow-up with PCP in 3-5 days if no improvement of symptoms Report to ER sooner if symptoms worsen.

## 2024-01-24 NOTE — PROGRESS NOTES
[facility]        NAME: Velasquez Adam is a 48 y.o. male  : 1975    MRN: [id number]  DATE: 2024  TIME: 8:48 AM    Assessment and Plan   Acute mucoid otitis media of right ear [H65.111]  1. Acute mucoid otitis media of right ear  amoxicillin (AMOXIL) 500 mg capsule      2. Sore throat  Poct Covid 19 Rapid Antigen Test    POCT rapid strepA      3. Acute cough  benzonatate (TESSALON PERLES) 100 mg capsule    fluticasone (FLONASE) 50 mcg/act nasal spray        Rapid Strep and COVID tests negative, suspect viral illness given clinical presentation. Antibiotics as directed for right ear infection. VSS in clinic, appears in no acute distress. Educated on use of OTC products for symptoms. Advised close follow-up with PCP or to report to the ER if symptoms worsen. Patient verbalizes understanding and agreeable to plan.     Patient Instructions     Give antibiotics as directed for next 7 days, complete entire course even if feeling better. Continue over-the-counter products for symptoms: tylenol for fevers, ibuprofen for body aches, flonase (fluticasone) for nasal congestion, tessalon perles for cough, and airborne/emergen-c for vitamin supplementation. Follow-up with PCP in 3-5 days if no improvement of symptoms Report to ER sooner if symptoms worsen.       Chief Complaint     Chief Complaint   Patient presents with    Sinusitis     Sinus congestion with headache bilateral ear muffled with slight pain sore throat and dry cough which all started yesterday. Took Robitussin last night which helped.          History of Present Illness       48 year old male presents for evaluation of sore throat, cough, and headache that started yesterday. He denies any known sick contacts or triggers. He is not UTD on COVID or flu vaccines for this season. He denies fevers, productive sputum, or shortness of breath. He has tried Robitussin for symptoms with some improvement.    URI   This is a new problem. The current  episode started in the past 7 days. The problem has been unchanged. There has been no fever. Associated symptoms include congestion, coughing, headaches, a plugged ear sensation, rhinorrhea and a sore throat. Pertinent negatives include no abdominal pain, chest pain, diarrhea, dysuria, ear pain, joint pain, joint swelling, nausea, neck pain, rash, sinus pain, sneezing, swollen glands, vomiting or wheezing. He has tried decongestant for the symptoms. The treatment provided mild relief.       Review of Systems   Review of Systems   Constitutional:  Negative for activity change, appetite change, chills, fatigue and fever.   HENT:  Positive for congestion, postnasal drip, rhinorrhea and sore throat. Negative for ear pain, sinus pressure, sinus pain, sneezing and trouble swallowing.    Eyes:  Negative for visual disturbance.   Respiratory:  Positive for cough. Negative for chest tightness, shortness of breath and wheezing.    Cardiovascular:  Negative for chest pain and palpitations.   Gastrointestinal:  Negative for abdominal pain, diarrhea, nausea and vomiting.   Genitourinary:  Negative for dysuria.   Musculoskeletal:  Negative for arthralgias, back pain, joint pain, myalgias and neck pain.   Skin:  Negative for color change and rash.   Allergic/Immunologic: Negative for environmental allergies and food allergies.   Neurological:  Positive for headaches. Negative for dizziness and light-headedness.         Current Medications       Current Outpatient Medications:     ALPRAZolam (XANAX) 0.5 mg tablet, Take 0.5 mg by mouth Three times daily as needed, Disp: , Rfl:     amoxicillin (AMOXIL) 500 mg capsule, Take 1 capsule (500 mg total) by mouth every 8 (eight) hours for 7 days, Disp: 21 capsule, Rfl: 0    atorvastatin (LIPITOR) 20 mg tablet, Take 20 mg by mouth daily, Disp: , Rfl:     benzonatate (TESSALON PERLES) 100 mg capsule, Take 1 capsule (100 mg total) by mouth 3 (three) times a day as needed for cough, Disp: 20  capsule, Rfl: 0    Cholecalciferol 50 MCG (2000 UT) CAPS, Take 1 capsule by mouth daily, Disp: , Rfl:     fluticasone (FLONASE) 50 mcg/act nasal spray, 1 spray into each nostril daily, Disp: 9.9 mL, Rfl: 0    dexamethasone (DECADRON) 1 mg tablet, TO BE TAKEN BETWEEN 11 PM AND MIDNIGHT AS PART OF THE DEXAMETHASO...  (REFER TO PRESCRIPTION NOTES). (Patient not taking: Reported on 1/24/2024), Disp: , Rfl:     gabapentin (NEURONTIN) 100 mg capsule, take 1 capsule by mouth nightly (Patient not taking: Reported on 1/24/2024), Disp: , Rfl:     ipratropium (ATROVENT) 0.06 % nasal spray, instill 1-2 sprays into each nostril four times a day if needed for RHINITIS (Patient not taking: Reported on 1/24/2024), Disp: , Rfl:     Multiple Vitamin (MULTI-VITAMIN DAILY PO), Take 1 tablet by mouth daily (Patient not taking: Reported on 1/24/2024), Disp: , Rfl:     pantoprazole (PROTONIX) 40 mg tablet, Take 40 mg by mouth daily, Disp: , Rfl:     Current Allergies     Allergies as of 01/24/2024 - Reviewed 01/24/2024   Allergen Reaction Noted    No known allergies Other (See Comments) 01/24/2024            The following portions of the patient's history were reviewed and updated as appropriate: allergies, current medications, past family history, past medical history, past social history, past surgical history and problem list.     Past Medical History:   Diagnosis Date    Anxiety     Hyperlipidemia        Past Surgical History:   Procedure Laterality Date    BACK SURGERY  10/2022    NO PAST SURGERIES         Family History   Problem Relation Age of Onset    No Known Problems Mother     Hyperlipidemia Father     Alzheimer's disease Maternal Grandfather     Stomach cancer Paternal Grandfather          Medications have been verified.        Objective   /86   Pulse 76   Temp (!) 97.2 °F (36.2 °C)   Wt 79.4 kg (175 lb)   SpO2 97%   BMI 27.41 kg/m²        Physical Exam     Physical Exam  Vitals and nursing note reviewed.    Constitutional:       General: He is awake. He is not in acute distress.     Appearance: Normal appearance. He is well-developed and normal weight.   HENT:      Head: Normocephalic and atraumatic.      Right Ear: Hearing, ear canal and external ear normal. Drainage present. A middle ear effusion is present.      Left Ear: Hearing, ear canal and external ear normal. A middle ear effusion is present.      Ears:      Comments: Mucoid otitis media of right ear     Nose: Congestion and rhinorrhea present. Rhinorrhea is clear.      Right Turbinates: Not enlarged, swollen or pale.      Left Turbinates: Not enlarged, swollen or pale.      Right Sinus: No maxillary sinus tenderness or frontal sinus tenderness.      Left Sinus: No maxillary sinus tenderness or frontal sinus tenderness.      Mouth/Throat:      Lips: Pink.      Mouth: Mucous membranes are moist.      Pharynx: Oropharynx is clear. Uvula midline. Posterior oropharyngeal erythema present. No pharyngeal swelling, oropharyngeal exudate or uvula swelling.      Tonsils: No tonsillar exudate or tonsillar abscesses.   Eyes:      Conjunctiva/sclera: Conjunctivae normal.   Cardiovascular:      Rate and Rhythm: Normal rate and regular rhythm.      Pulses: Normal pulses.      Heart sounds: Normal heart sounds.   Pulmonary:      Effort: Pulmonary effort is normal.      Breath sounds: Normal breath sounds.   Musculoskeletal:         General: Normal range of motion.      Cervical back: Full passive range of motion without pain, normal range of motion and neck supple.   Lymphadenopathy:      Cervical: No cervical adenopathy.   Skin:     General: Skin is warm and dry.   Neurological:      General: No focal deficit present.      Mental Status: He is alert and oriented to person, place, and time.   Psychiatric:         Mood and Affect: Mood normal.         Behavior: Behavior normal. Behavior is cooperative.         Thought Content: Thought content normal.         Judgment:  Judgment normal.

## 2024-12-02 NOTE — TELEPHONE ENCOUNTER
This is a patient of Dr Amauri Damon seen by Kaykay Mcmahon in [facility]  Patient had a question about who to contact for scheduling his ultrasound  I transferred patient to central scheduling 
4

## 2025-03-12 NOTE — PROGRESS NOTES
Name: Velasquez Adam      : 1975      MRN: [id number]  Encounter Provider: Jane Florentino PA-C  Encounter Date: 3/13/2025   Encounter department: [facility]OLOG[facility] [place]  :  Assessment & Plan  Other male erectile dysfunction  - Taking Cialis 10 mg PRN with good benefit. Will continue  - Can follow up PRN at this time and continue to obtain refills through PCP.            History of Present Illness   Velasquez Adam is a 50 y.o. male who presents for evaluation of erectile dysfunction. He previously was seen for this issue in .  He was prescribed Cialis 10 mg by his PCP recently. He reports intermittent difficulties attaining and maintaining erections. No pain with erections or curvature. He has been taking Cialis with good benefit and adequate erections. Denies any ADRs. Denies any urinary complaints. No family history of prostate cancer.       AUA SYMPTOM SCORE      Flowsheet Row Most Recent Value   AUA SYMPTOM SCORE    How often have you had a sensation of not emptying your bladder completely after you finished urinating? 1 (P)     How often have you had to urinate again less than two hours after you finished urinating? 1 (P)     How often have you found you stopped and started again several times when you urinate? 1 (P)     How often have you found it difficult to postpone urination? 1 (P)     How often have you had a weak urinary stream? 4 (P)     How often have you had to push or strain to begin urination? 2 (P)     How many times did you most typically get up to urinate from the time you went to bed at night until the time you got up in the morning? 1 (P)     Quality of Life: If you were to spend the rest of your life with your urinary condition just the way it is now, how would you feel about that? 2 (P)     AUA SYMPTOM SCORE 11 (P)            Review of Systems   Constitutional:  Negative for chills and fever.   Respiratory:  Negative for shortness of breath.    Cardiovascular:  Negative  for chest pain.   Gastrointestinal:  Negative for abdominal pain.   Genitourinary:  Negative for difficulty urinating, dysuria, flank pain, frequency, hematuria, penile pain and urgency.   Neurological:  Negative for dizziness.          Objective   There were no vitals taken for this visit.    Physical Exam  Constitutional:       Appearance: Normal appearance.   HENT:      Head: Normocephalic and atraumatic.      Right Ear: External ear normal.      Left Ear: External ear normal.      Nose: Nose normal.   Eyes:      General: No scleral icterus.     Conjunctiva/sclera: Conjunctivae normal.   Cardiovascular:      Pulses: Normal pulses.   Pulmonary:      Effort: Pulmonary effort is normal.   Musculoskeletal:         General: Normal range of motion.      Cervical back: Normal range of motion.   Neurological:      General: No focal deficit present.      Mental Status: He is alert and oriented to person, place, and time.   Psychiatric:         Mood and Affect: Mood normal.         Behavior: Behavior normal.         Thought Content: Thought content normal.         Judgment: Judgment normal.          Results   Lab Results   Component Value Date    PSA 0.5 02/04/2021    PSA 0.6 02/06/2020     Lab Results   Component Value Date    CALCIUM 8.8 11/26/2024    K 4 11/26/2024    CO2 27 11/26/2024     11/26/2024    BUN 14 11/26/2024    CREATININE 0.81 11/26/2024     Lab Results   Component Value Date    WBC 3.94 (L) 02/04/2021    HGB 16.0 02/04/2021    HCT 48.8 02/04/2021    MCV 92 02/04/2021     02/04/2021       Office Urine Dip  No results found for this or any previous visit (from the past hour).

## 2025-03-13 ENCOUNTER — OFFICE VISIT (OUTPATIENT)
Dept: UROLOGY | Facility: CLINIC | Age: 50
End: 2025-03-13
Payer: COMMERCIAL

## 2025-03-13 VITALS
HEIGHT: 67 IN | WEIGHT: 180 LBS | BODY MASS INDEX: 28.25 KG/M2 | SYSTOLIC BLOOD PRESSURE: 110 MMHG | HEART RATE: 68 BPM | TEMPERATURE: 97.2 F | RESPIRATION RATE: 16 BRPM | DIASTOLIC BLOOD PRESSURE: 80 MMHG | OXYGEN SATURATION: 98 %

## 2025-03-13 DIAGNOSIS — N52.8 OTHER MALE ERECTILE DYSFUNCTION: Primary | ICD-10-CM

## 2025-03-13 PROCEDURE — 99203 OFFICE O/P NEW LOW 30 MIN: CPT | Performed by: PHYSICIAN ASSISTANT

## 2025-08-12 ENCOUNTER — EVALUATION (OUTPATIENT)
Dept: PHYSICAL THERAPY | Facility: CLINIC | Age: 50
End: 2025-08-12
Payer: COMMERCIAL

## 2025-08-15 ENCOUNTER — OFFICE VISIT (OUTPATIENT)
Dept: PHYSICAL THERAPY | Facility: CLINIC | Age: 50
End: 2025-08-15
Attending: ORTHOPAEDIC SURGERY
Payer: COMMERCIAL

## 2025-08-19 ENCOUNTER — OFFICE VISIT (OUTPATIENT)
Dept: PHYSICAL THERAPY | Facility: CLINIC | Age: 50
End: 2025-08-19
Attending: ORTHOPAEDIC SURGERY
Payer: COMMERCIAL

## 2025-08-19 DIAGNOSIS — Z98.890 HISTORY OF LUMBAR LAMINECTOMY FOR SPINAL CORD DECOMPRESSION: ICD-10-CM

## 2025-08-19 DIAGNOSIS — M48.061 LUMBAR STENOSIS WITHOUT NEUROGENIC CLAUDICATION: Primary | ICD-10-CM

## 2025-08-19 DIAGNOSIS — Z98.1 S/P LUMBAR FUSION: ICD-10-CM

## 2025-08-19 PROCEDURE — 97112 NEUROMUSCULAR REEDUCATION: CPT

## 2025-08-19 PROCEDURE — 97110 THERAPEUTIC EXERCISES: CPT

## 2025-08-21 ENCOUNTER — OFFICE VISIT (OUTPATIENT)
Dept: PHYSICAL THERAPY | Facility: CLINIC | Age: 50
End: 2025-08-21
Attending: ORTHOPAEDIC SURGERY
Payer: COMMERCIAL

## 2025-08-21 DIAGNOSIS — M48.061 LUMBAR STENOSIS WITHOUT NEUROGENIC CLAUDICATION: Primary | ICD-10-CM

## 2025-08-21 DIAGNOSIS — Z98.890 HISTORY OF LUMBAR LAMINECTOMY FOR SPINAL CORD DECOMPRESSION: ICD-10-CM

## 2025-08-21 DIAGNOSIS — Z98.1 S/P LUMBAR FUSION: ICD-10-CM

## 2025-08-21 PROCEDURE — 97110 THERAPEUTIC EXERCISES: CPT

## 2025-08-21 PROCEDURE — 97530 THERAPEUTIC ACTIVITIES: CPT
